# Patient Record
Sex: FEMALE | Race: ASIAN | Employment: UNEMPLOYED | URBAN - METROPOLITAN AREA
[De-identification: names, ages, dates, MRNs, and addresses within clinical notes are randomized per-mention and may not be internally consistent; named-entity substitution may affect disease eponyms.]

---

## 2020-09-24 PROBLEM — K42.9 UMBILICAL HERNIA: Status: ACTIVE | Noted: 2020-08-13

## 2020-10-01 ENCOUNTER — OFFICE VISIT (OUTPATIENT)
Dept: GYNECOLOGY | Age: 50
End: 2020-10-01
Payer: MEDICAID

## 2020-10-01 VITALS
WEIGHT: 167 LBS | BODY MASS INDEX: 28.51 KG/M2 | HEIGHT: 64 IN | TEMPERATURE: 97.2 F | DIASTOLIC BLOOD PRESSURE: 84 MMHG | HEART RATE: 131 BPM | SYSTOLIC BLOOD PRESSURE: 129 MMHG

## 2020-10-01 DIAGNOSIS — D64.9 ANEMIA, UNSPECIFIED TYPE: ICD-10-CM

## 2020-10-01 DIAGNOSIS — N92.1 MENORRHAGIA WITH IRREGULAR CYCLE: ICD-10-CM

## 2020-10-01 LAB
CONTROL: NORMAL
HCT VFR BLD CALC: 32.2 % (ref 36–48)
HEMOGLOBIN: 10.1 G/DL (ref 12–16)
MCH RBC QN AUTO: 22.6 PG (ref 26–34)
MCHC RBC AUTO-ENTMCNC: 31.3 G/DL (ref 31–36)
MCV RBC AUTO: 72 FL (ref 80–100)
PDW BLD-RTO: 18.4 % (ref 12.4–15.4)
PLATELET # BLD: 308 K/UL (ref 135–450)
PMV BLD AUTO: 9.5 FL (ref 5–10.5)
PREGNANCY TEST URINE, POC: NEGATIVE
RBC # BLD: 4.47 M/UL (ref 4–5.2)
WBC # BLD: 10.2 K/UL (ref 4–11)

## 2020-10-01 PROCEDURE — G8484 FLU IMMUNIZE NO ADMIN: HCPCS | Performed by: OBSTETRICS & GYNECOLOGY

## 2020-10-01 PROCEDURE — 81025 URINE PREGNANCY TEST: CPT | Performed by: OBSTETRICS & GYNECOLOGY

## 2020-10-01 PROCEDURE — 99386 PREV VISIT NEW AGE 40-64: CPT | Performed by: OBSTETRICS & GYNECOLOGY

## 2020-10-01 RX ORDER — METHOCARBAMOL 750 MG/1
TABLET ORAL
COMMUNITY
Start: 2020-09-16

## 2020-10-01 RX ORDER — FERROUS SULFATE 325(65) MG
TABLET ORAL
Status: ON HOLD | COMMUNITY
Start: 2020-09-16 | End: 2020-11-15 | Stop reason: SDUPTHER

## 2020-10-01 RX ORDER — LANOLIN ALCOHOL/MO/W.PET/CERES
CREAM (GRAM) TOPICAL
COMMUNITY
Start: 2020-09-16

## 2020-10-01 RX ORDER — ZINC OXIDE 13 %
CREAM (GRAM) TOPICAL
COMMUNITY
Start: 2020-09-16

## 2020-10-01 RX ORDER — NORTRIPTYLINE HYDROCHLORIDE 25 MG/1
CAPSULE ORAL
COMMUNITY
Start: 2020-09-09

## 2020-10-01 RX ORDER — GABAPENTIN 300 MG/1
CAPSULE ORAL
Status: ON HOLD | COMMUNITY
Start: 2020-07-03 | End: 2020-11-15 | Stop reason: HOSPADM

## 2020-10-01 SDOH — HEALTH STABILITY: MENTAL HEALTH: HOW OFTEN DO YOU HAVE A DRINK CONTAINING ALCOHOL?: NEVER

## 2020-10-01 ASSESSMENT — ENCOUNTER SYMPTOMS
DIARRHEA: 0
COUGH: 0
COLOR CHANGE: 0
BLOOD IN STOOL: 0
SORE THROAT: 0
WHEEZING: 0
APNEA: 0
RECTAL PAIN: 0
ABDOMINAL DISTENTION: 0
CONSTIPATION: 0
CHEST TIGHTNESS: 0
TROUBLE SWALLOWING: 0
PHOTOPHOBIA: 0
SHORTNESS OF BREATH: 0
ANAL BLEEDING: 0
BACK PAIN: 0
VOMITING: 0
NAUSEA: 0
ABDOMINAL PAIN: 0

## 2020-10-01 NOTE — PROGRESS NOTES
cervical nabothian cysts. Small debris-filled duodenal diverticulum. Last hemoglobin was 10.3 on 07/06/2020 - at [facility]   [address] Maintenance   Topic Date Due    HIV screen  12/16/1987    Cervical cancer screen  12/16/1993    Lipid screen  12/16/2012    Diabetes screen  12/16/2012    Flu vaccine (1) 09/01/2020    DTaP/Tdap/Td vaccine (2 - Td) 08/16/2023    Hepatitis A vaccine  Aged Out    Hepatitis B vaccine  Aged Out    Hib vaccine  Aged Out    Meningococcal (ACWY) vaccine  Aged Out    Pneumococcal 0-64 years Vaccine  Aged Out       No past medical history on file. No past surgical history on file. OB History   No obstetric history on file.      Social History     Socioeconomic History    Marital status:      Spouse name: Not on file    Number of children: Not on file    Years of education: Not on file    Highest education level: Not on file   Occupational History    Not on file   Social Needs    Financial resource strain: Not on file    Food insecurity     Worry: Not on file     Inability: Not on file    Transportation needs     Medical: Not on file     Non-medical: Not on file   Tobacco Use    Smoking status: Never Smoker    Smokeless tobacco: Never Used   Substance and Sexual Activity    Alcohol use: Never     Frequency: Never    Drug use: Never    Sexual activity: Not on file   Lifestyle    Physical activity     Days per week: Not on file     Minutes per session: Not on file    Stress: Not on file   Relationships    Social connections     Talks on phone: Not on file     Gets together: Not on file     Attends Scientologist service: Not on file     Active member of club or organization: Not on file     Attends meetings of clubs or organizations: Not on file     Relationship status: Not on file    Intimate partner violence     Fear of current or ex partner: Not on file     Emotionally abused: Not on file     Physically abused: Not on file     Forced sexual activity: Not on file   Other Topics Concern    Not on file   Social History Narrative    Not on file     No Known Allergies  Outpatient Medications Marked as Taking for the 10/1/20 encounter (Office Visit) with Brigido Craven MD   Medication Sig Dispense Refill    vitamin B-12 (CYANOCOBALAMIN) 1000 MCG tablet TAKE ONE TABLET BY MOUTH DAILY      D3-50 1.25 MG (45722 UT) CAPS TK 1 C PO 1 TIME A WK      FEROSUL 325 (65 Fe) MG tablet TK 1 T PO D      gabapentin (NEURONTIN) 300 MG capsule TK 1 C PO QHS.  PROCTOZONE-HC 2.5 % CREA rectal cream PETE EXT AA BID      nortriptyline (PAMELOR) 25 MG capsule TK 1 C PO QHS      Probiotic Product (PROBIOTIC DAILY) CAPS TK ONE C PO QD      norethindrone (AYGESTIN) 5 MG tablet Take 1 tablet by mouth daily 15 tablet 0     No family history on file. Review of Systems:  Review of Systems   Constitutional: Negative for appetite change, chills, fatigue, fever and unexpected weight change. HENT: Negative for ear pain, hearing loss, mouth sores, sore throat and trouble swallowing. Eyes: Negative for photophobia and visual disturbance. Respiratory: Negative for apnea, cough, chest tightness, shortness of breath and wheezing. Cardiovascular: Negative for chest pain, palpitations and leg swelling. Gastrointestinal: Negative for abdominal distention, abdominal pain, anal bleeding, blood in stool, constipation, diarrhea, nausea, rectal pain and vomiting. Endocrine: Negative for cold intolerance, heat intolerance, polydipsia, polyphagia and polyuria. Genitourinary: Positive for menstrual problem and vaginal bleeding. Negative for difficulty urinating, dyspareunia, dysuria, enuresis, frequency, genital sores, hematuria, pelvic pain, urgency, vaginal discharge and vaginal pain. Musculoskeletal: Negative for arthralgias, back pain, joint swelling and myalgias. Skin: Negative for color change and rash.    Neurological: Negative for dizziness, seizures,

## 2020-10-06 ENCOUNTER — HOSPITAL ENCOUNTER (OUTPATIENT)
Dept: ULTRASOUND IMAGING | Age: 50
Discharge: HOME | End: 2020-10-06
Payer: MEDICAID

## 2020-10-06 PROCEDURE — 76856 US EXAM PELVIC COMPLETE: CPT

## 2020-10-06 PROCEDURE — 76830 TRANSVAGINAL US NON-OB: CPT

## 2020-10-16 ENCOUNTER — PROCEDURE VISIT (OUTPATIENT)
Dept: GYNECOLOGY | Age: 50
End: 2020-10-16
Payer: MEDICAID

## 2020-10-16 VITALS
DIASTOLIC BLOOD PRESSURE: 73 MMHG | HEART RATE: 93 BPM | SYSTOLIC BLOOD PRESSURE: 126 MMHG | WEIGHT: 167 LBS | BODY MASS INDEX: 28.51 KG/M2 | HEIGHT: 64 IN | TEMPERATURE: 97.4 F

## 2020-10-16 PROCEDURE — 99214 OFFICE O/P EST MOD 30 MIN: CPT | Performed by: OBSTETRICS & GYNECOLOGY

## 2020-10-16 PROCEDURE — G8484 FLU IMMUNIZE NO ADMIN: HCPCS | Performed by: OBSTETRICS & GYNECOLOGY

## 2020-10-16 PROCEDURE — G8427 DOCREV CUR MEDS BY ELIG CLIN: HCPCS | Performed by: OBSTETRICS & GYNECOLOGY

## 2020-10-16 PROCEDURE — 81025 URINE PREGNANCY TEST: CPT | Performed by: OBSTETRICS & GYNECOLOGY

## 2020-10-16 PROCEDURE — 1036F TOBACCO NON-USER: CPT | Performed by: OBSTETRICS & GYNECOLOGY

## 2020-10-16 PROCEDURE — G8419 CALC BMI OUT NRM PARAM NOF/U: HCPCS | Performed by: OBSTETRICS & GYNECOLOGY

## 2020-10-16 PROCEDURE — 58100 BIOPSY OF UTERUS LINING: CPT | Performed by: OBSTETRICS & GYNECOLOGY

## 2020-10-16 ASSESSMENT — ENCOUNTER SYMPTOMS
ANAL BLEEDING: 0
SHORTNESS OF BREATH: 0
WHEEZING: 0
ABDOMINAL DISTENTION: 0
COLOR CHANGE: 0
COUGH: 0
BACK PAIN: 0
TROUBLE SWALLOWING: 0
RECTAL PAIN: 0
PHOTOPHOBIA: 0
CHEST TIGHTNESS: 0
APNEA: 0
CONSTIPATION: 0
VOMITING: 0
DIARRHEA: 0
NAUSEA: 0
ABDOMINAL PAIN: 0
SORE THROAT: 0
BLOOD IN STOOL: 0

## 2020-10-16 NOTE — PROGRESS NOTES
10/01/2023    Hepatitis A vaccine  Aged Out    Hepatitis B vaccine  Aged Out    Hib vaccine  Aged Out    Meningococcal (ACWY) vaccine  Aged Out    Pneumococcal 0-64 years Vaccine  Aged Out       No past medical history on file. No past surgical history on file. OB History   No obstetric history on file. Social History     Socioeconomic History    Marital status:      Spouse name: Not on file    Number of children: Not on file    Years of education: Not on file    Highest education level: Not on file   Occupational History    Not on file   Social Needs    Financial resource strain: Not on file    Food insecurity     Worry: Not on file     Inability: Not on file    Transportation needs     Medical: Not on file     Non-medical: Not on file   Tobacco Use    Smoking status: Never Smoker    Smokeless tobacco: Never Used   Substance and Sexual Activity    Alcohol use: Never     Frequency: Never    Drug use: Never    Sexual activity: Not on file   Lifestyle    Physical activity     Days per week: Not on file     Minutes per session: Not on file    Stress: Not on file   Relationships    Social connections     Talks on phone: Not on file     Gets together: Not on file     Attends Rastafarian service: Not on file     Active member of club or organization: Not on file     Attends meetings of clubs or organizations: Not on file     Relationship status: Not on file    Intimate partner violence     Fear of current or ex partner: Not on file     Emotionally abused: Not on file     Physically abused: Not on file     Forced sexual activity: Not on file   Other Topics Concern    Not on file   Social History Narrative    Not on file     No Known Allergies  No outpatient medications have been marked as taking for the 10/16/20 encounter (Procedure visit) with Douglas Macdonald MD.     No family history on file.       Review of Systems:  Review of Systems   Constitutional: Negative for appetite change, chills, fatigue, fever and unexpected weight change. HENT: Negative for ear pain, hearing loss, mouth sores, sore throat and trouble swallowing. Eyes: Negative for photophobia and visual disturbance. Respiratory: Negative for apnea, cough, chest tightness, shortness of breath and wheezing. Cardiovascular: Negative for chest pain, palpitations and leg swelling. Gastrointestinal: Negative for abdominal distention, abdominal pain, anal bleeding, blood in stool, constipation, diarrhea, nausea, rectal pain and vomiting. Endocrine: Negative for cold intolerance, heat intolerance, polydipsia, polyphagia and polyuria. Genitourinary: Positive for menstrual problem. Negative for difficulty urinating, dyspareunia, dysuria, enuresis, frequency, genital sores, hematuria, pelvic pain, urgency, vaginal bleeding, vaginal discharge and vaginal pain. Musculoskeletal: Negative for arthralgias, back pain, joint swelling and myalgias. Skin: Negative for color change and rash. Neurological: Negative for dizziness, seizures, syncope, light-headedness and headaches. Psychiatric/Behavioral: Negative for agitation, behavioral problems, confusion, decreased concentration, dysphoric mood, hallucinations, self-injury, sleep disturbance and suicidal ideas. The patient is not nervous/anxious and is not hyperactive. Physical Exam:  /73   Pulse 93   Temp 97.4 °F (36.3 °C)   Ht 5' 4\" (1.626 m)   Wt 167 lb (75.8 kg)   LMP 10/13/2020 (Exact Date)   BMI 28.67 kg/m²   BP Readings from Last 3 Encounters:   10/16/20 126/73   10/01/20 129/84     Body mass index is 28.67 kg/m². Physical Exam  Constitutional:       General: She is not in acute distress. Appearance: She is well-developed. HENT:      Head: Normocephalic and atraumatic. Mouth/Throat:      Pharynx: No oropharyngeal exudate. Eyes:      General: No scleral icterus. Right eye: No discharge. Left eye: No discharge. Conjunctiva/sclera: Conjunctivae normal.   Neck:      Thyroid: No thyromegaly. Cardiovascular:      Rate and Rhythm: Normal rate and regular rhythm. Heart sounds: Normal heart sounds. Pulmonary:      Effort: Pulmonary effort is normal.      Breath sounds: Normal breath sounds. Abdominal:      General: Bowel sounds are normal. There is no distension. Palpations: Abdomen is soft. There is no mass. Tenderness: There is no abdominal tenderness. There is no guarding or rebound. Genitourinary:     Labia:         Right: No rash, tenderness, lesion or injury. Left: No rash, tenderness, lesion or injury. Vagina: Normal. No signs of injury and foreign body. No vaginal discharge, erythema or tenderness. Cervix: No cervical motion tenderness, discharge or friability. Uterus: Enlarged. Not deviated, not fixed and not tender. Adnexa:         Right: No mass, tenderness or fullness. Left: No mass, tenderness or fullness. Rectum: No external hemorrhoid. Skin:     General: Skin is warm. Coloration: Skin is not pale. Findings: No erythema or rash. Neurological:      Mental Status: She is alert. Psychiatric:         Behavior: Behavior normal. Behavior is cooperative. Thought Content: Thought content normal.         Judgment: Judgment normal.       Procedure note: endometrial biopsy  Indications for procedure reviewed. Risks and benefits of procedure discussed. Written and informed consent reviewed and signed. Patient was positioned in the dorsal lithotomy position. The speculum was then placed vaginally and the cervix was prepped with betadine x 3 . A single tooth tenaculum was then applied to the anterior lip of the cervix. A endometrial pipelle was then inserted into the patients uterus for 3 passes. Cell/specimen collection was found to be adequate.   The single tooth tenaculum was then removed from the anterior lip of the cervix and hemostasis was assured. The patient tolerated the procedure well. The uterus sounded to 9  cm during the procedure. Aftercare was discussed and explained to the patient prior to leaving the office. Assessment/Plan:  1. Iron deficiency anemia, unspecified iron deficiency anemia type  Continue oral iron therapy    2. Dysmenorrhea  Secondary to fibroids and possible adenomyosis   - POCT urine pregnancy  - Surgical Pathology    3. Menorrhagia with irregular cycle  Discussed all treatment options - medical and surgical - she may want either an mirena IUD or hysterectomy- final plan next visit   - POCT urine pregnancy  - Surgical Pathology  - 68520 - DC BIOPSY OF UTERUS LINING    4. Fibroids  - POCT urine pregnancy  - Surgical Pathology  - 31611 - DC BIOPSY OF UTERUS LINING    5. Endometrial thickening on ultrasound  - POCT urine pregnancy  - Surgical Pathology  - 69086 - DC BIOPSY OF UTERUS LINING    Approximately 45  minutes spent in counseling and coordination of care with over 50% in direct face to face counseling. Return in about 1 week (around 10/23/2020).

## 2020-10-21 ENCOUNTER — OFFICE VISIT (OUTPATIENT)
Dept: GYNECOLOGY | Age: 50
End: 2020-10-21
Payer: MEDICAID

## 2020-10-21 VITALS
SYSTOLIC BLOOD PRESSURE: 131 MMHG | BODY MASS INDEX: 28.17 KG/M2 | TEMPERATURE: 97.5 F | WEIGHT: 165 LBS | DIASTOLIC BLOOD PRESSURE: 80 MMHG | HEART RATE: 128 BPM | HEIGHT: 64 IN

## 2020-10-21 LAB
BACTERIA WET PREP: NORMAL
CLUE CELLS: NORMAL
EPITHELIAL CELLS WET PREP: NORMAL
RBC WET PREP: NORMAL
SOURCE WET PREP: NORMAL
TRICHOMONAS PREP: NORMAL
WBC WET PREP: NORMAL
YEAST WET PREP: NORMAL

## 2020-10-21 PROCEDURE — 99214 OFFICE O/P EST MOD 30 MIN: CPT | Performed by: OBSTETRICS & GYNECOLOGY

## 2020-10-21 PROCEDURE — G8419 CALC BMI OUT NRM PARAM NOF/U: HCPCS | Performed by: OBSTETRICS & GYNECOLOGY

## 2020-10-21 PROCEDURE — G8484 FLU IMMUNIZE NO ADMIN: HCPCS | Performed by: OBSTETRICS & GYNECOLOGY

## 2020-10-21 PROCEDURE — G8427 DOCREV CUR MEDS BY ELIG CLIN: HCPCS | Performed by: OBSTETRICS & GYNECOLOGY

## 2020-10-21 PROCEDURE — 1036F TOBACCO NON-USER: CPT | Performed by: OBSTETRICS & GYNECOLOGY

## 2020-10-21 RX ORDER — LANOLIN ALCOHOL/MO/W.PET/CERES
325 CREAM (GRAM) TOPICAL 2 TIMES DAILY
Qty: 60 TABLET | Refills: 0 | Status: SHIPPED | OUTPATIENT
Start: 2020-10-21 | End: 2020-12-01 | Stop reason: SDUPTHER

## 2020-10-21 RX ORDER — NORGESTIMATE AND ETHINYL ESTRADIOL 0.25-0.035
1 KIT ORAL DAILY
Qty: 1 PACKET | Refills: 3 | Status: SHIPPED | OUTPATIENT
Start: 2020-10-21 | End: 2020-10-21 | Stop reason: ALTCHOICE

## 2020-10-21 RX ORDER — METRONIDAZOLE 500 MG/1
TABLET ORAL
Qty: 28 TABLET | Refills: 0 | Status: ON HOLD | OUTPATIENT
Start: 2020-10-21 | End: 2020-11-15 | Stop reason: HOSPADM

## 2020-10-21 RX ORDER — DOXYCYCLINE HYCLATE 100 MG
100 TABLET ORAL 2 TIMES DAILY
Qty: 28 TABLET | Refills: 0 | Status: SHIPPED | OUTPATIENT
Start: 2020-10-21 | End: 2020-11-04

## 2020-10-21 ASSESSMENT — ENCOUNTER SYMPTOMS
WHEEZING: 0
SORE THROAT: 0
BACK PAIN: 0
SHORTNESS OF BREATH: 0
CHEST TIGHTNESS: 0
BLOOD IN STOOL: 0
PHOTOPHOBIA: 0
COUGH: 0
ABDOMINAL PAIN: 0
RECTAL PAIN: 0
ANAL BLEEDING: 0
TROUBLE SWALLOWING: 0
VOMITING: 0
ABDOMINAL DISTENTION: 0
COLOR CHANGE: 0
CONSTIPATION: 0
APNEA: 0
DIARRHEA: 0
NAUSEA: 0

## 2020-10-21 NOTE — PROGRESS NOTES
Willis Pereyra  YOB: 1970    Date of Service:  10/21/2020    Chief Complaint:   Willis Pereyra is a 54 y.o. [place] female who presents for follow-up on heavy irregular menses, left ovarian cyst, history of anemia and fibroids - discuss surgery        HPI:  Patient is premenopausal- Patient's last menstrual period was 10/13/2020 (exact date). .She is here to discuss hysterectomy for heavy irregular menses, fibroids,  left ovarian cyst, discuss surgery, history of anemia. Patient had an endometrial biopsy done on 10/16/2020 ->     FINAL DIAGNOSIS:     Endometrium, biopsy:      - Disordered proliferative endometrium with acute endometritis and        stromal changes suggestive for exogenous hormone effect (OCP).    - Negative for atypia or malignancy. She is  and monogamous, denies symptoms of pelvic inflammatory disease. No fevers, no abdominal pain. She had heavy bleeding after the endometrial biopsy last week but now 2-3 pads/day    HPI last visit -> She has regular menses with heavy flow lasting 6-7 days for many years. Reports daily vaginal bleeding x 2 months which stopped about 10 day ago. She was [facility] at [facility] on 08/13/2020 and      CT scan abdomen and pelvis done ->     Genital Structures: The uterus is enlarged. The anterior uterine fundus there is a well-circumscribed 2.6 x 1.9 x 3 cm soft tissue lesion that is favored to represent a fibroid. In the cervix, there are multiple cystic cystic structures that appear to represent nabothian cysts. IMPRESSION:    Uterine fibroid and multiple large cervical nabothian cysts. Small debris-filled duodenal diverticulum.     Work-up   Pap smear - normal   Pelvic ultrasound -   COMMENTS:         Uterus: 11.2 x 7.1 x 9.1 cm. Fundal leiomyoma measures 2.9 x 2.0 x 3.0 cm.  Heterogeneous echotexture is nonspecific but can be seen with adenomyosis.         Endometrium: 21 mm.         Ovaries: 3.6 x 4.0 x 2.7 cm simple cystic lesion in the left ovary. The ovaries are otherwise unremarkable with normal color Doppler flow. 1.9 cm exophytic or paraovarian right cyst.         Free pelvic fluid: None                        Impression         Uterine fibroid.         Possible adenomyosis. MRI may be helpful for further evaluation.         10 week follow-up ultrasound is indicated to document resolution of a 4.0 cm cystic lesion in the left ovary.         Health Maintenance   Topic Date Due    HIV screen  12/16/1987    Lipid screen  12/16/2012    Diabetes screen  12/16/2012    Flu vaccine (1) 09/01/2020    DTaP/Tdap/Td vaccine (2 - Td) 08/16/2023    Cervical cancer screen  10/01/2023    Hepatitis A vaccine  Aged Out    Hepatitis B vaccine  Aged Out    Hib vaccine  Aged Out    Meningococcal (ACWY) vaccine  Aged Out    Pneumococcal 0-64 years Vaccine  Aged Out       No past medical history on file. No past surgical history on file. OB History   No obstetric history on file.      Social History     Socioeconomic History    Marital status:      Spouse name: Not on file    Number of children: Not on file    Years of education: Not on file    Highest education level: Not on file   Occupational History    Not on file   Social Needs    Financial resource strain: Not on file    Food insecurity     Worry: Not on file     Inability: Not on file    Transportation needs     Medical: Not on file     Non-medical: Not on file   Tobacco Use    Smoking status: Never Smoker    Smokeless tobacco: Never Used   Substance and Sexual Activity    Alcohol use: Never     Frequency: Never    Drug use: Never    Sexual activity: Not on file   Lifestyle    Physical activity     Days per week: Not on file     Minutes per session: Not on file    Stress: Not on file   Relationships    Social connections     Talks on phone: Not on file     Gets together: Not on file     Attends Quaker service: Not on file     Active member of club or abdominal pain, anal bleeding, blood in stool, constipation, diarrhea, nausea, rectal pain and vomiting. Endocrine: Negative for cold intolerance, heat intolerance, polydipsia, polyphagia and polyuria. Genitourinary: Positive for menstrual problem and vaginal bleeding. Negative for difficulty urinating, dyspareunia, dysuria, enuresis, frequency, genital sores, hematuria, pelvic pain, urgency, vaginal discharge and vaginal pain. Musculoskeletal: Negative for arthralgias, back pain, joint swelling and myalgias. Skin: Negative for color change and rash. Neurological: Negative for dizziness, seizures, syncope, light-headedness and headaches. Psychiatric/Behavioral: Negative for agitation, behavioral problems, confusion, decreased concentration, dysphoric mood, hallucinations, self-injury, sleep disturbance and suicidal ideas. The patient is not nervous/anxious and is not hyperactive. Physical Exam:  /80   Pulse 128   Temp 97.5 °F (36.4 °C)   Ht 5' 4\" (1.626 m)   Wt 165 lb (74.8 kg)   LMP 10/13/2020 (Exact Date)   BMI 28.32 kg/m²   BP Readings from Last 3 Encounters:   10/21/20 131/80   10/16/20 126/73   10/01/20 129/84      Body mass index is 28.32 kg/m². Physical Exam  Constitutional:       General: She is not in acute distress. Appearance: She is well-developed. HENT:      Head: Normocephalic and atraumatic. Mouth/Throat:      Pharynx: No oropharyngeal exudate. Eyes:      General: No scleral icterus. Right eye: No discharge. Left eye: No discharge. Conjunctiva/sclera: Conjunctivae normal.   Neck:      Thyroid: No thyromegaly. Cardiovascular:      Rate and Rhythm: Normal rate and regular rhythm. Heart sounds: Normal heart sounds. Pulmonary:      Effort: Pulmonary effort is normal.      Breath sounds: Normal breath sounds. Abdominal:      General: Bowel sounds are normal. There is no distension. Palpations: Abdomen is soft.  There is no mass.      Tenderness: There is no abdominal tenderness. There is no guarding or rebound. Genitourinary:     Labia:         Right: No rash, tenderness, lesion or injury. Left: No rash, tenderness, lesion or injury. Vagina: No signs of injury and foreign body. Bleeding (moderate blood with clot ) present. No vaginal discharge, erythema or tenderness. Cervix: No cervical motion tenderness, discharge or friability. Uterus: Enlarged. Not deviated, not fixed and not tender. Adnexa:         Right: No mass, tenderness or fullness. Left: No mass, tenderness or fullness. Rectum: No mass or external hemorrhoid. Skin:     General: Skin is warm. Coloration: Skin is not pale. Findings: No erythema or rash. Neurological:      Mental Status: She is alert. Psychiatric:         Behavior: Behavior normal. Behavior is cooperative. Thought Content: Thought content normal.         Judgment: Judgment normal.             Assessment/Plan  1. Acute endometritis  - C.trachomatis N.gonorrhoeae DNA  - Wet prep, genital  - metroNIDAZOLE (FLAGYL) 500 MG tablet; Take one pill po bid x 14 days  Dispense: 28 tablet; Refill: 0  - doxycycline hyclate (VIBRA-TABS) 100 MG tablet; Take 1 tablet by mouth 2 times daily for 14 days  Dispense: 28 tablet; Refill: 0    2. Fibroids  - norethindrone (AYGESTIN) 5 MG tablet; Take  1 pill po bid x 5 days then one pill qd  Dispense: 30 tablet; Refill: 0  3. Menorrhagia with regular cycle  4. Left ovarian cyst    All treatment options discussed with patient including medical and surgical - ie oral contraception, depoprovera vs uterine artery embolization vs myomectomy vs hysterectomy.  She is not a good candidate for mirena IUD or novasure ablation secondary to large size of uterus - SHE IS INTERESTED IN A HYSTERECTOMY      PLAN FOR TOTAL ABDOMINAL HYSTERECTOMY, BILATERAL SALPINGECTOMIES, left oophorectomy possible right oophorectomy         I discussed the surgery in depth with the patient including risks, benefits and alternatives to planned surgery. The risks include but are not limited to bleeding, infection, damage to internal organs (e.g., bladder, bowel, ureters), if injury were to occur to any internal organs the need for further surgery discussed, risk of anesthesia and possibility of blood transfusion and risk of blood clots discussed. The risk of re-operation of 5 -10 % with ovarian preservation discussed. If she gets a supracervical hysterectomy she will need regular pap smears afterwards. She is aware that she will not be able to further pregnancies after a hysterectomy. Patient voiced understanding and agrees to proceed.      Will pre-certify with insurance   Pre-operative clearance needed        Will call patient in 1-2 weeks to schedule surgery     5. Iron deficiency anemia, unspecified iron deficiency anemia type  - ferrous sulfate (FE TABS 325) 325 (65 Fe) MG EC tablet; Take 1 tablet by mouth 2 times daily  Dispense: 60 tablet; Refill: 0          Approximately 30 minutes spent in counseling and coordination of care with over 50% in direct face to face counseling.

## 2020-10-22 LAB
C TRACH DNA GENITAL QL NAA+PROBE: NEGATIVE
N. GONORRHOEAE DNA: NEGATIVE

## 2020-10-27 ENCOUNTER — TELEPHONE (OUTPATIENT)
Dept: GYNECOLOGY | Age: 50
End: 2020-10-27

## 2020-10-28 ENCOUNTER — TELEPHONE (OUTPATIENT)
Dept: GYNECOLOGY | Age: 50
End: 2020-10-28

## 2020-11-09 ENCOUNTER — TELEPHONE (OUTPATIENT)
Dept: PRIMARY CARE CLINIC | Age: 50
End: 2020-11-09

## 2020-11-09 NOTE — TELEPHONE ENCOUNTER
Leonie Gonsalez from scheduling pre-admissions called and stated that the pt hasn't come in yet for Covid testing. She needs to come in by 2:45 pm today or she will have to reschedule her surgery. Please call bradley at: [phone] for any  Further questions.

## 2020-11-09 NOTE — TELEPHONE ENCOUNTER
Tamia Gaitan from [facility] called and wants to know if pt needs a translater, a covid-19 test and needs the orders for surgery.

## 2020-11-10 ENCOUNTER — TELEPHONE (OUTPATIENT)
Dept: GYNECOLOGY | Age: 50
End: 2020-11-10

## 2020-11-10 ENCOUNTER — NURSE ONLY (OUTPATIENT)
Dept: PRIMARY CARE CLINIC | Age: 50
End: 2020-11-10
Payer: MEDICAID

## 2020-11-10 PROCEDURE — 99211 OFF/OP EST MAY X REQ PHY/QHP: CPT | Performed by: NURSE PRACTITIONER

## 2020-11-10 NOTE — TELEPHONE ENCOUNTER
Sharlene Renee from [facility] is requesting a call back from staff . In regards to patients upcoming surgery , Please follow up to advise [phone].

## 2020-11-10 NOTE — PROGRESS NOTES
Left message with Cholo Woodall to have Dr. Halle Hamilton surgery scheduler to call us. Attempted to ask pt where she is having pre-op done. Pt kept repeating \"[facility]\". Also, we are awaiting opre-op orders      Spoke with Nain Zabala at Dr. Halle Hamilton office. Pt told Dr. Eulalio Queen that she had H&P done on 11-9 at Dr. Tj Barajas. This office is now closed. Nain Zabala said she will call then tomorrow morning and fax us H&P    Called Dr. Benson Ponce back x2. Office to fax ASAP.

## 2020-11-11 ENCOUNTER — ANESTHESIA EVENT (OUTPATIENT)
Dept: OPERATING ROOM | Age: 50
DRG: 519 | End: 2020-11-11
Payer: MEDICAID

## 2020-11-11 LAB — SARS-COV-2: NOT DETECTED

## 2020-11-12 ENCOUNTER — ANESTHESIA (OUTPATIENT)
Dept: OPERATING ROOM | Age: 50
DRG: 519 | End: 2020-11-12
Payer: MEDICAID

## 2020-11-12 ENCOUNTER — HOSPITAL ENCOUNTER (INPATIENT)
Age: 50
LOS: 3 days | Discharge: HOME | DRG: 519 | End: 2020-11-15
Attending: OBSTETRICS & GYNECOLOGY | Admitting: OBSTETRICS & GYNECOLOGY
Payer: MEDICAID

## 2020-11-12 VITALS
RESPIRATION RATE: 20 BRPM | DIASTOLIC BLOOD PRESSURE: 60 MMHG | SYSTOLIC BLOOD PRESSURE: 122 MMHG | TEMPERATURE: 95.5 F | OXYGEN SATURATION: 93 %

## 2020-11-12 PROBLEM — Z90.710 S/P TAH (TOTAL ABDOMINAL HYSTERECTOMY): Status: ACTIVE | Noted: 2020-11-12

## 2020-11-12 LAB
ABO/RH: NORMAL
ANTIBODY SCREEN: NORMAL
HCT VFR BLD CALC: 35.8 % (ref 36–48)
HEMOGLOBIN: 11.2 G/DL (ref 12–16)
MCH RBC QN AUTO: 23.8 PG (ref 26–34)
MCHC RBC AUTO-ENTMCNC: 31.4 G/DL (ref 31–36)
MCV RBC AUTO: 75.9 FL (ref 80–100)
PDW BLD-RTO: 24 % (ref 12.4–15.4)
PLATELET # BLD: 261 K/UL (ref 135–450)
PMV BLD AUTO: 9.4 FL (ref 5–10.5)
PREGNANCY, URINE: NEGATIVE
RBC # BLD: 4.72 M/UL (ref 4–5.2)
WBC # BLD: 7.5 K/UL (ref 4–11)

## 2020-11-12 PROCEDURE — 86900 BLOOD TYPING SEROLOGIC ABO: CPT

## 2020-11-12 PROCEDURE — 2720000010 HC SURG SUPPLY STERILE: Performed by: OBSTETRICS & GYNECOLOGY

## 2020-11-12 PROCEDURE — 3600000014 HC SURGERY LEVEL 4 ADDTL 15MIN: Performed by: OBSTETRICS & GYNECOLOGY

## 2020-11-12 PROCEDURE — 3700000001 HC ADD 15 MINUTES (ANESTHESIA): Performed by: OBSTETRICS & GYNECOLOGY

## 2020-11-12 PROCEDURE — 0UT70ZZ RESECTION OF BILATERAL FALLOPIAN TUBES, OPEN APPROACH: ICD-10-PCS | Performed by: OBSTETRICS & GYNECOLOGY

## 2020-11-12 PROCEDURE — 3700000000 HC ANESTHESIA ATTENDED CARE: Performed by: OBSTETRICS & GYNECOLOGY

## 2020-11-12 PROCEDURE — 6360000002 HC RX W HCPCS: Performed by: ANESTHESIOLOGY

## 2020-11-12 PROCEDURE — 6360000002 HC RX W HCPCS

## 2020-11-12 PROCEDURE — 0UT90ZZ RESECTION OF UTERUS, OPEN APPROACH: ICD-10-PCS | Performed by: OBSTETRICS & GYNECOLOGY

## 2020-11-12 PROCEDURE — 6360000002 HC RX W HCPCS: Performed by: NURSE ANESTHETIST, CERTIFIED REGISTERED

## 2020-11-12 PROCEDURE — 86850 RBC ANTIBODY SCREEN: CPT

## 2020-11-12 PROCEDURE — 58150 TOTAL HYSTERECTOMY: CPT | Performed by: OBSTETRICS & GYNECOLOGY

## 2020-11-12 PROCEDURE — 7100000001 HC PACU RECOVERY - ADDTL 15 MIN: Performed by: OBSTETRICS & GYNECOLOGY

## 2020-11-12 PROCEDURE — 86901 BLOOD TYPING SEROLOGIC RH(D): CPT

## 2020-11-12 PROCEDURE — 3600000004 HC SURGERY LEVEL 4 BASE: Performed by: OBSTETRICS & GYNECOLOGY

## 2020-11-12 PROCEDURE — 2709999900 HC NON-CHARGEABLE SUPPLY: Performed by: OBSTETRICS & GYNECOLOGY

## 2020-11-12 PROCEDURE — 85027 COMPLETE CBC AUTOMATED: CPT

## 2020-11-12 PROCEDURE — 7100000000 HC PACU RECOVERY - FIRST 15 MIN: Performed by: OBSTETRICS & GYNECOLOGY

## 2020-11-12 PROCEDURE — 88307 TISSUE EXAM BY PATHOLOGIST: CPT

## 2020-11-12 PROCEDURE — 1200000000 HC SEMI PRIVATE

## 2020-11-12 PROCEDURE — 6360000002 HC RX W HCPCS: Performed by: OBSTETRICS & GYNECOLOGY

## 2020-11-12 PROCEDURE — 2500000003 HC RX 250 WO HCPCS: Performed by: NURSE ANESTHETIST, CERTIFIED REGISTERED

## 2020-11-12 PROCEDURE — 2580000003 HC RX 258: Performed by: ANESTHESIOLOGY

## 2020-11-12 PROCEDURE — C9290 INJ, BUPIVACAINE LIPOSOME: HCPCS | Performed by: ANESTHESIOLOGY

## 2020-11-12 PROCEDURE — 2580000003 HC RX 258: Performed by: OBSTETRICS & GYNECOLOGY

## 2020-11-12 PROCEDURE — 84703 CHORIONIC GONADOTROPIN ASSAY: CPT

## 2020-11-12 PROCEDURE — 64488 TAP BLOCK BI INJECTION: CPT | Performed by: ANESTHESIOLOGY

## 2020-11-12 PROCEDURE — 2500000003 HC RX 250 WO HCPCS: Performed by: ANESTHESIOLOGY

## 2020-11-12 RX ORDER — SODIUM CHLORIDE 0.9 % (FLUSH) 0.9 %
10 SYRINGE (ML) INJECTION PRN
Status: DISCONTINUED | OUTPATIENT
Start: 2020-11-12 | End: 2020-11-15 | Stop reason: HOSPADM

## 2020-11-12 RX ORDER — HYDRALAZINE HYDROCHLORIDE 20 MG/ML
5 INJECTION INTRAMUSCULAR; INTRAVENOUS EVERY 5 MIN PRN
Status: DISCONTINUED | OUTPATIENT
Start: 2020-11-12 | End: 2020-11-12 | Stop reason: HOSPADM

## 2020-11-12 RX ORDER — SODIUM CHLORIDE 0.9 % (FLUSH) 0.9 %
10 SYRINGE (ML) INJECTION EVERY 12 HOURS SCHEDULED
Status: DISCONTINUED | OUTPATIENT
Start: 2020-11-12 | End: 2020-11-12 | Stop reason: HOSPADM

## 2020-11-12 RX ORDER — ENALAPRILAT 2.5 MG/2ML
1.25 INJECTION INTRAVENOUS
Status: DISCONTINUED | OUTPATIENT
Start: 2020-11-12 | End: 2020-11-12 | Stop reason: HOSPADM

## 2020-11-12 RX ORDER — ONDANSETRON 2 MG/ML
4 INJECTION INTRAMUSCULAR; INTRAVENOUS EVERY 6 HOURS PRN
Status: DISCONTINUED | OUTPATIENT
Start: 2020-11-12 | End: 2020-11-15 | Stop reason: HOSPADM

## 2020-11-12 RX ORDER — KETOROLAC TROMETHAMINE 30 MG/ML
30 INJECTION, SOLUTION INTRAMUSCULAR; INTRAVENOUS EVERY 6 HOURS
Status: DISPENSED | OUTPATIENT
Start: 2020-11-12 | End: 2020-11-14

## 2020-11-12 RX ORDER — LIDOCAINE HYDROCHLORIDE 10 MG/ML
1 INJECTION, SOLUTION EPIDURAL; INFILTRATION; INTRACAUDAL; PERINEURAL
Status: COMPLETED | OUTPATIENT
Start: 2020-11-12 | End: 2020-11-12

## 2020-11-12 RX ORDER — KETOROLAC TROMETHAMINE 30 MG/ML
INJECTION, SOLUTION INTRAMUSCULAR; INTRAVENOUS PRN
Status: DISCONTINUED | OUTPATIENT
Start: 2020-11-12 | End: 2020-11-12 | Stop reason: SDUPTHER

## 2020-11-12 RX ORDER — SODIUM CHLORIDE, SODIUM LACTATE, POTASSIUM CHLORIDE, CALCIUM CHLORIDE 600; 310; 30; 20 MG/100ML; MG/100ML; MG/100ML; MG/100ML
INJECTION, SOLUTION INTRAVENOUS CONTINUOUS
Status: DISCONTINUED | OUTPATIENT
Start: 2020-11-12 | End: 2020-11-14

## 2020-11-12 RX ORDER — BUPIVACAINE HYDROCHLORIDE 5 MG/ML
INJECTION, SOLUTION EPIDURAL; INTRACAUDAL
Status: COMPLETED
Start: 2020-11-12 | End: 2020-11-12

## 2020-11-12 RX ORDER — SODIUM CHLORIDE 9 MG/ML
INJECTION, SOLUTION INTRAVENOUS CONTINUOUS
Status: DISCONTINUED | OUTPATIENT
Start: 2020-11-12 | End: 2020-11-14

## 2020-11-12 RX ORDER — ONDANSETRON 2 MG/ML
4 INJECTION INTRAMUSCULAR; INTRAVENOUS
Status: DISCONTINUED | OUTPATIENT
Start: 2020-11-12 | End: 2020-11-12 | Stop reason: HOSPADM

## 2020-11-12 RX ORDER — SODIUM CHLORIDE 0.9 % (FLUSH) 0.9 %
10 SYRINGE (ML) INJECTION PRN
Status: DISCONTINUED | OUTPATIENT
Start: 2020-11-12 | End: 2020-11-12 | Stop reason: HOSPADM

## 2020-11-12 RX ORDER — BUPIVACAINE HYDROCHLORIDE 5 MG/ML
INJECTION, SOLUTION EPIDURAL; INTRACAUDAL PRN
Status: DISCONTINUED | OUTPATIENT
Start: 2020-11-12 | End: 2020-11-12 | Stop reason: SDUPTHER

## 2020-11-12 RX ORDER — MAGNESIUM HYDROXIDE 1200 MG/15ML
LIQUID ORAL CONTINUOUS PRN
Status: DISCONTINUED | OUTPATIENT
Start: 2020-11-12 | End: 2020-11-12 | Stop reason: ALTCHOICE

## 2020-11-12 RX ORDER — DEXAMETHASONE SODIUM PHOSPHATE 4 MG/ML
INJECTION, SOLUTION INTRA-ARTICULAR; INTRALESIONAL; INTRAMUSCULAR; INTRAVENOUS; SOFT TISSUE PRN
Status: DISCONTINUED | OUTPATIENT
Start: 2020-11-12 | End: 2020-11-12 | Stop reason: SDUPTHER

## 2020-11-12 RX ORDER — FENTANYL CITRATE 50 UG/ML
50 INJECTION, SOLUTION INTRAMUSCULAR; INTRAVENOUS EVERY 5 MIN PRN
Status: DISCONTINUED | OUTPATIENT
Start: 2020-11-12 | End: 2020-11-12 | Stop reason: HOSPADM

## 2020-11-12 RX ORDER — GLYCOPYRROLATE 0.2 MG/ML
INJECTION INTRAMUSCULAR; INTRAVENOUS PRN
Status: DISCONTINUED | OUTPATIENT
Start: 2020-11-12 | End: 2020-11-12 | Stop reason: SDUPTHER

## 2020-11-12 RX ORDER — PROPOFOL 10 MG/ML
INJECTION, EMULSION INTRAVENOUS PRN
Status: DISCONTINUED | OUTPATIENT
Start: 2020-11-12 | End: 2020-11-12 | Stop reason: SDUPTHER

## 2020-11-12 RX ORDER — CEFAZOLIN SODIUM 2 G/50ML
2 SOLUTION INTRAVENOUS ONCE
Status: COMPLETED | OUTPATIENT
Start: 2020-11-12 | End: 2020-11-12

## 2020-11-12 RX ORDER — LABETALOL 20 MG/4 ML (5 MG/ML) INTRAVENOUS SYRINGE
5 EVERY 10 MIN PRN
Status: DISCONTINUED | OUTPATIENT
Start: 2020-11-12 | End: 2020-11-12 | Stop reason: HOSPADM

## 2020-11-12 RX ORDER — FENTANYL CITRATE 50 UG/ML
25 INJECTION, SOLUTION INTRAMUSCULAR; INTRAVENOUS EVERY 5 MIN PRN
Status: DISCONTINUED | OUTPATIENT
Start: 2020-11-12 | End: 2020-11-12 | Stop reason: HOSPADM

## 2020-11-12 RX ORDER — FENTANYL CITRATE 50 UG/ML
INJECTION, SOLUTION INTRAMUSCULAR; INTRAVENOUS PRN
Status: DISCONTINUED | OUTPATIENT
Start: 2020-11-12 | End: 2020-11-12 | Stop reason: SDUPTHER

## 2020-11-12 RX ORDER — ROCURONIUM BROMIDE 10 MG/ML
INJECTION, SOLUTION INTRAVENOUS PRN
Status: DISCONTINUED | OUTPATIENT
Start: 2020-11-12 | End: 2020-11-12 | Stop reason: SDUPTHER

## 2020-11-12 RX ORDER — ONDANSETRON 2 MG/ML
INJECTION INTRAMUSCULAR; INTRAVENOUS PRN
Status: DISCONTINUED | OUTPATIENT
Start: 2020-11-12 | End: 2020-11-12 | Stop reason: SDUPTHER

## 2020-11-12 RX ORDER — PROMETHAZINE HYDROCHLORIDE 25 MG/1
12.5 TABLET ORAL EVERY 6 HOURS PRN
Status: DISCONTINUED | OUTPATIENT
Start: 2020-11-12 | End: 2020-11-15 | Stop reason: HOSPADM

## 2020-11-12 RX ADMIN — BUPIVACAINE HYDROCHLORIDE 10 ML: 5 INJECTION, SOLUTION EPIDURAL; INTRACAUDAL; PERINEURAL at 12:30

## 2020-11-12 RX ADMIN — SODIUM CHLORIDE, SODIUM LACTATE, POTASSIUM CHLORIDE, AND CALCIUM CHLORIDE: 600; 310; 30; 20 INJECTION, SOLUTION INTRAVENOUS at 08:40

## 2020-11-12 RX ADMIN — HYDROMORPHONE HYDROCHLORIDE 0.25 MG: 1 INJECTION, SOLUTION INTRAMUSCULAR; INTRAVENOUS; SUBCUTANEOUS at 15:19

## 2020-11-12 RX ADMIN — BUPIVACAINE 10 ML: 13.3 INJECTION, SUSPENSION, LIPOSOMAL INFILTRATION at 12:30

## 2020-11-12 RX ADMIN — SODIUM CHLORIDE, SODIUM LACTATE, POTASSIUM CHLORIDE, AND CALCIUM CHLORIDE: 600; 310; 30; 20 INJECTION, SOLUTION INTRAVENOUS at 09:03

## 2020-11-12 RX ADMIN — LIDOCAINE HYDROCHLORIDE 50 MG: 10 INJECTION, SOLUTION EPIDURAL; INFILTRATION; INTRACAUDAL; PERINEURAL at 09:06

## 2020-11-12 RX ADMIN — GLYCOPYRROLATE 0.6 MG: 0.2 INJECTION INTRAMUSCULAR; INTRAVENOUS at 11:47

## 2020-11-12 RX ADMIN — KETOROLAC TROMETHAMINE 30 MG: 30 INJECTION, SOLUTION INTRAMUSCULAR at 18:58

## 2020-11-12 RX ADMIN — PROPOFOL 180 MG: 10 INJECTION, EMULSION INTRAVENOUS at 09:06

## 2020-11-12 RX ADMIN — FENTANYL CITRATE 50 MCG: 50 INJECTION INTRAMUSCULAR; INTRAVENOUS at 09:03

## 2020-11-12 RX ADMIN — BUPIVACAINE 10 ML: 13.3 INJECTION, SUSPENSION, LIPOSOMAL INFILTRATION at 12:20

## 2020-11-12 RX ADMIN — BUPIVACAINE HYDROCHLORIDE 10 ML: 5 INJECTION, SOLUTION EPIDURAL; INTRACAUDAL; PERINEURAL at 12:20

## 2020-11-12 RX ADMIN — HYDROMORPHONE HYDROCHLORIDE 0.25 MG: 1 INJECTION, SOLUTION INTRAMUSCULAR; INTRAVENOUS; SUBCUTANEOUS at 12:33

## 2020-11-12 RX ADMIN — HYDROMORPHONE HYDROCHLORIDE 0.5 MG: 1 INJECTION, SOLUTION INTRAMUSCULAR; INTRAVENOUS; SUBCUTANEOUS at 20:33

## 2020-11-12 RX ADMIN — SODIUM CHLORIDE, SODIUM LACTATE, POTASSIUM CHLORIDE, AND CALCIUM CHLORIDE: 600; 310; 30; 20 INJECTION, SOLUTION INTRAVENOUS at 11:25

## 2020-11-12 RX ADMIN — HYDROMORPHONE HYDROCHLORIDE 0.5 MG: 1 INJECTION, SOLUTION INTRAMUSCULAR; INTRAVENOUS; SUBCUTANEOUS at 13:10

## 2020-11-12 RX ADMIN — NEOSTIGMINE METHYLSULFATE 4 MG: 1 INJECTION INTRAMUSCULAR; INTRAVENOUS; SUBCUTANEOUS at 11:47

## 2020-11-12 RX ADMIN — PHENYLEPHRINE HYDROCHLORIDE 100 MCG: 10 INJECTION, SOLUTION INTRAMUSCULAR; INTRAVENOUS; SUBCUTANEOUS at 09:34

## 2020-11-12 RX ADMIN — HYDROMORPHONE HYDROCHLORIDE 0.5 MG: 1 INJECTION, SOLUTION INTRAMUSCULAR; INTRAVENOUS; SUBCUTANEOUS at 11:44

## 2020-11-12 RX ADMIN — KETOROLAC TROMETHAMINE 30 MG: 30 INJECTION, SOLUTION INTRAMUSCULAR; INTRAVENOUS at 11:47

## 2020-11-12 RX ADMIN — HYDROMORPHONE HYDROCHLORIDE 0.25 MG: 1 INJECTION, SOLUTION INTRAMUSCULAR; INTRAVENOUS; SUBCUTANEOUS at 12:41

## 2020-11-12 RX ADMIN — SODIUM CHLORIDE, POTASSIUM CHLORIDE, SODIUM LACTATE AND CALCIUM CHLORIDE: 600; 310; 30; 20 INJECTION, SOLUTION INTRAVENOUS at 18:47

## 2020-11-12 RX ADMIN — CEFAZOLIN SODIUM 2 G: 2 SOLUTION INTRAVENOUS at 09:05

## 2020-11-12 RX ADMIN — DEXAMETHASONE SODIUM PHOSPHATE 8 MG: 4 INJECTION, SOLUTION INTRAMUSCULAR; INTRAVENOUS at 09:45

## 2020-11-12 RX ADMIN — ROCURONIUM BROMIDE 50 MG: 10 INJECTION INTRAVENOUS at 09:06

## 2020-11-12 RX ADMIN — ONDANSETRON 4 MG: 2 INJECTION INTRAMUSCULAR; INTRAVENOUS at 11:07

## 2020-11-12 RX ADMIN — FENTANYL CITRATE 50 MCG: 50 INJECTION INTRAMUSCULAR; INTRAVENOUS at 10:32

## 2020-11-12 RX ADMIN — HYDROMORPHONE HYDROCHLORIDE 0.5 MG: 1 INJECTION, SOLUTION INTRAMUSCULAR; INTRAVENOUS; SUBCUTANEOUS at 13:31

## 2020-11-12 RX ADMIN — ROCURONIUM BROMIDE 20 MG: 10 INJECTION INTRAVENOUS at 10:17

## 2020-11-12 RX ADMIN — FENTANYL CITRATE 50 MCG: 50 INJECTION INTRAMUSCULAR; INTRAVENOUS at 09:36

## 2020-11-12 RX ADMIN — FENTANYL CITRATE 50 MCG: 50 INJECTION INTRAMUSCULAR; INTRAVENOUS at 11:27

## 2020-11-12 RX ADMIN — HYDROMORPHONE HYDROCHLORIDE 0.25 MG: 1 INJECTION, SOLUTION INTRAMUSCULAR; INTRAVENOUS; SUBCUTANEOUS at 16:09

## 2020-11-12 ASSESSMENT — PULMONARY FUNCTION TESTS
PIF_VALUE: 30
PIF_VALUE: 25
PIF_VALUE: 28
PIF_VALUE: 26
PIF_VALUE: 1
PIF_VALUE: 28
PIF_VALUE: 28
PIF_VALUE: 24
PIF_VALUE: 26
PIF_VALUE: 30
PIF_VALUE: 25
PIF_VALUE: 14
PIF_VALUE: 15
PIF_VALUE: 27
PIF_VALUE: 29
PIF_VALUE: 29
PIF_VALUE: 26
PIF_VALUE: 28
PIF_VALUE: 26
PIF_VALUE: 31
PIF_VALUE: 28
PIF_VALUE: 26
PIF_VALUE: 21
PIF_VALUE: 26
PIF_VALUE: 29
PIF_VALUE: 4
PIF_VALUE: 27
PIF_VALUE: 28
PIF_VALUE: 29
PIF_VALUE: 28
PIF_VALUE: 27
PIF_VALUE: 29
PIF_VALUE: 31
PIF_VALUE: 24
PIF_VALUE: 14
PIF_VALUE: 26
PIF_VALUE: 27
PIF_VALUE: 31
PIF_VALUE: 26
PIF_VALUE: 14
PIF_VALUE: 29
PIF_VALUE: 5
PIF_VALUE: 27
PIF_VALUE: 30
PIF_VALUE: 27
PIF_VALUE: 26
PIF_VALUE: 4
PIF_VALUE: 29
PIF_VALUE: 27
PIF_VALUE: 29
PIF_VALUE: 27
PIF_VALUE: 26
PIF_VALUE: 28
PIF_VALUE: 29
PIF_VALUE: 27
PIF_VALUE: 15
PIF_VALUE: 27
PIF_VALUE: 4
PIF_VALUE: 26
PIF_VALUE: 29
PIF_VALUE: 29
PIF_VALUE: 14
PIF_VALUE: 20
PIF_VALUE: 28
PIF_VALUE: 28
PIF_VALUE: 31
PIF_VALUE: 29
PIF_VALUE: 19
PIF_VALUE: 26
PIF_VALUE: 28
PIF_VALUE: 25
PIF_VALUE: 3
PIF_VALUE: 31
PIF_VALUE: 21
PIF_VALUE: 29
PIF_VALUE: 4
PIF_VALUE: 28
PIF_VALUE: 26
PIF_VALUE: 31
PIF_VALUE: 26
PIF_VALUE: 29
PIF_VALUE: 26
PIF_VALUE: 4
PIF_VALUE: 26
PIF_VALUE: 24
PIF_VALUE: 27
PIF_VALUE: 28
PIF_VALUE: 32
PIF_VALUE: 26
PIF_VALUE: 29
PIF_VALUE: 26
PIF_VALUE: 30
PIF_VALUE: 29
PIF_VALUE: 26
PIF_VALUE: 28
PIF_VALUE: 28
PIF_VALUE: 27
PIF_VALUE: 26
PIF_VALUE: 29
PIF_VALUE: 27
PIF_VALUE: 27
PIF_VALUE: 28
PIF_VALUE: 29
PIF_VALUE: 32
PIF_VALUE: 20
PIF_VALUE: 29
PIF_VALUE: 26
PIF_VALUE: 28
PIF_VALUE: 27
PIF_VALUE: 30
PIF_VALUE: 19
PIF_VALUE: 27
PIF_VALUE: 27
PIF_VALUE: 29
PIF_VALUE: 29
PIF_VALUE: 28
PIF_VALUE: 27
PIF_VALUE: 26
PIF_VALUE: 30
PIF_VALUE: 30
PIF_VALUE: 29
PIF_VALUE: 28
PIF_VALUE: 29
PIF_VALUE: 29
PIF_VALUE: 27
PIF_VALUE: 32
PIF_VALUE: 29
PIF_VALUE: 29
PIF_VALUE: 26
PIF_VALUE: 25
PIF_VALUE: 27
PIF_VALUE: 30
PIF_VALUE: 14
PIF_VALUE: 15
PIF_VALUE: 28
PIF_VALUE: 29
PIF_VALUE: 27
PIF_VALUE: 29
PIF_VALUE: 26
PIF_VALUE: 26
PIF_VALUE: 23
PIF_VALUE: 30
PIF_VALUE: 25
PIF_VALUE: 32
PIF_VALUE: 26
PIF_VALUE: 3
PIF_VALUE: 30
PIF_VALUE: 28
PIF_VALUE: 26
PIF_VALUE: 25
PIF_VALUE: 4
PIF_VALUE: 25
PIF_VALUE: 1
PIF_VALUE: 27
PIF_VALUE: 30
PIF_VALUE: 27
PIF_VALUE: 27
PIF_VALUE: 29
PIF_VALUE: 28
PIF_VALUE: 27
PIF_VALUE: 26
PIF_VALUE: 26
PIF_VALUE: 28
PIF_VALUE: 26
PIF_VALUE: 28
PIF_VALUE: 28
PIF_VALUE: 27
PIF_VALUE: 29

## 2020-11-12 ASSESSMENT — PAIN SCALES - GENERAL
PAINLEVEL_OUTOF10: 5
PAINLEVEL_OUTOF10: 5
PAINLEVEL_OUTOF10: 10
PAINLEVEL_OUTOF10: 5
PAINLEVEL_OUTOF10: 8
PAINLEVEL_OUTOF10: 9

## 2020-11-12 ASSESSMENT — PAIN DESCRIPTION - PAIN TYPE: TYPE: ACUTE PAIN;SURGICAL PAIN

## 2020-11-12 ASSESSMENT — PAIN DESCRIPTION - LOCATION: LOCATION: ABDOMEN

## 2020-11-12 ASSESSMENT — PAIN DESCRIPTION - ORIENTATION: ORIENTATION: MID;LOWER

## 2020-11-12 ASSESSMENT — PAIN - FUNCTIONAL ASSESSMENT: PAIN_FUNCTIONAL_ASSESSMENT: 0-10

## 2020-11-12 NOTE — ANESTHESIA PROCEDURE NOTES
TAP    Patient location during procedure: post-op  Staffing  Anesthesiologist: Thor Silva MD  Preanesthetic Checklist  Completed: patient identified, site marked, surgical consent, pre-op evaluation, timeout performed, IV checked, risks and benefits discussed, monitors and equipment checked, anesthesia consent given, oxygen available and patient being monitored  Peripheral Block  Patient position: supine  Prep: ChloraPrep  Patient monitoring: cardiac monitor, continuous pulse ox, frequent blood pressure checks and IV access  Block type: TAP  Laterality: bilateral  Injection technique: single-shot  Procedures: ultrasound guided  Local infiltration: bupivacaine  Infiltration strength: 0.5 %  Dose: 3 mL  Provider prep: mask and sterile gloves  Local infiltration: bupivacaine  Needle  Needle type: short-bevel   Needle gauge: 20 G  Needle length: 10 cm  Needle localization: ultrasound guidance  Assessment  Injection assessment: negative aspiration for heme, no paresthesia on injection and local visualized surrounding nerve on ultrasound  Paresthesia pain: none  Slow fractionated injection: yes  Hemodynamics: stable  Additional Notes  Bilateral TAP block with each side receiving  10 ml of  Exparel  10 ml of point 5% Marcaine and 10 ml of NaCl and pt tolerated the block without problems. External Oblique muscle, Internal Oblique muscle, Transversus Abdominis muscle are identified; the tip of the needle and the spread of the local anesthetic between the Internal Oblique muscle and the Transversus Abdominis muscles are visualized. The muscles appeared to be anatomically normal and there were no abnormal pathologically findings seen.          Reason for block: post-op pain management and at surgeon's request

## 2020-11-12 NOTE — H&P
None     Forced sexual activity: None   Other Topics Concern    None   Social History Narrative    None         Medications Prior to Admission:      Prior to Admission medications    Medication Sig Start Date End Date Taking? Authorizing Provider   FEROSUL 325 (65 Fe) MG tablet TK 1 T PO D 9/16/20  Yes Historical Provider, MD   metroNIDAZOLE (FLAGYL) 500 MG tablet Take one pill po bid x 14 days 10/21/20   Petty Pacheco MD   norethindrone (AYGESTIN) 5 MG tablet Take  1 pill po bid x 5 days then one pill qd 10/21/20   Petty Pacheco MD   ferrous sulfate (FE TABS 325) 325 (65 Fe) MG EC tablet Take 1 tablet by mouth 2 times daily 10/21/20   Petty Pacheco MD   vitamin B-12 (CYANOCOBALAMIN) 1000 MCG tablet TAKE ONE TABLET BY MOUTH DAILY 9/16/20   Historical Provider, MD   D3-50 1.25 MG (58945 UT) CAPS TK 1 C PO 1 TIME A WK 9/16/20   Historical Provider, MD   gabapentin (NEURONTIN) 300 MG capsule TK 1 C PO QHS. 7/3/20   Historical Provider, MD   PROCTOZONE-HC 2.5 % CREA rectal cream PETE EXT AA BID 9/16/20   Historical Provider, MD   nortriptyline (PAMELOR) 25 MG capsule TK 1 C PO QHS 9/9/20   Historical Provider, MD   Probiotic Product (PROBIOTIC DAILY) CAPS TK ONE C PO QD 9/16/20   Historical Provider, MD         Allergies:  Patient has no known allergies.     PHYSICAL EXAM:      BP (!) 146/87   Pulse 99   Temp 98.2 °F (36.8 °C) (Oral)   Resp 16   Ht 5' 2\" (1.575 m)   Wt 165 lb (74.8 kg)   LMP 10/13/2020 (Exact Date)   SpO2 96%   BMI 30.18 kg/m²      Airway:  Airway patent with no audible stridor    Heart:  regular rate and rhythm, No murmur noted    Lungs:  No increased work of breathing, good air exchange, clear to auscultation bilaterally, no crackles or wheezing    Abdomen:  soft, non-distended, non-tender, no rebound tenderness or guarding, normal active bowel sounds and no masses palpated    ASSESSMENT AND PLAN     Patient is a 54 y.o. female with above specified procedure planned. 1.  Patient seen and focused exam done today- no new changes since last physical exam on 11/9/20    2. Access to ancillary services are available per request of the provider.     LINDSAY Lowe - WENDY     11/12/2020

## 2020-11-12 NOTE — LETTER
[facility]   Fax   [phone]            Date Of Procedure:  2020     PATIENT:       Carole Gonzalez                    :  1970      No Known Allergies    No.   PHYSICIAN ORDERS                Date / Time of Order:   11/10/20   10:37 AM          Procedure: PLAN FOR TOTAL ABDOMINAL HYSTERECTOMY, BILATERAL SALPINGECTOMIES, left oophorectomy possible right oophorectomy            1. Cefazolin (Ancef) 2 gm IV OCTOR   ** NOTE:  If patient weight is > 120 kg, Administer 3 gm x        2. IF allergic to Penicillin, give          Clindamycin (Cleocin)   900 mg IVPB         3.    IF allergic to Penicillin AND Clindamycin, give          Vancomycin 15 mg per 1 Kg IVPB. Round to the nearest 250 mg         4. Urine Pregnancy Test X   5.   CBC X    Physician / Surgeon:  Andree Lubin MD   Office Ph:  [phone]       Physician Signature:   11/10/20

## 2020-11-12 NOTE — PROGRESS NOTES
Pt arrived asleep restless O2 applied in pacu with nc report from anesthesia.  Dr tSephanie Lowery at bedside Leny Rosado CRNA and transport crna to give tape block

## 2020-11-12 NOTE — PROGRESS NOTES
PACU Transfer to Floor Note    Current Allergies: Patient has no known allergies. Pt meets criteria as per Romie Score and ASPAN Standards to transfer to next phase of care. Temp 97.7  HR 90  /89  SpO2: 98 %    O2 Flow Rate (L/min): 2 L/min      Intake/Output Summary (Last 24 hours) at 11/12/2020 1858  Last data filed at 11/12/2020 1845  Gross per 24 hour   Intake 2818 ml   Output 300 ml   Net 2518 ml       Pain assessment:  present - adequately treated    Pain Level: 5 (IV Toradol Given)    Patient was assessed for alterations to skin integrity. There were no alterations observed. Pt has borrego catheter and abdominal binder.     Handoff report given at bedside to [facility], [facility] updated via phone call with room #      11/12/2020 6:58 PM

## 2020-11-12 NOTE — BRIEF OP NOTE
Brief Postoperative Note      Patient: Marina Portillo  YOB: 1970  MRN: [id number]    Date of Procedure: 11/12/2020    Pre-Op Diagnosis: MENORRHAGIA, FIBROIDS, LEFT OVARIAN CYST     Post-Op Diagnosis: MENORRHAGIA, FIBROIDS, BILATERAL PARATUBAL CYSTS        Procedure(s):  TOTAL ABDOMINAL HYSTERECTOMY BILATERAL SALPINGECTOMIES,    Surgeon(s):  Adolfo Martini MD    Assistant:  Surgical Assistant: Kain Kenney    Anesthesia: General    Estimated Blood Loss (mL): 50 cc     Complications: None    Specimens:   ID Type Source Tests Collected by Time Destination   A : UTERUS, CERVIX, BILATERAL TUBES, RIGHT PERITUBAL CYST Specimen Abdomen SURGICAL PATHOLOGY Petty Harley Morse MD 11/12/2020 1120        Implants:  * No implants in log *      Drains:   Urethral Catheter Latex;Straight-tip 16 fr (Active)       Findings: fibroids uterus, bilateral paratubal cysts, bilateral ovaries normal     Electronically signed by Adolfo Martini MD on 11/12/2020 at 11:56 AM

## 2020-11-12 NOTE — ANESTHESIA POSTPROCEDURE EVALUATION
Department of Anesthesiology  Postprocedure Note    Patient: Herbert Topete  MRN: [id number]  YOB: 1970  Date of evaluation: 11/12/2020  Time:  1:17 PM     Procedure Summary     Date:  11/12/20 Room / Location:  [facility] [address]    Anesthesia Start:  1627 Anesthesia Stop:  9850    Procedure:  TOTAL ABDOMINAL HYSTERECTOMY BILATERAL SALPINGECTOMIES, (Left Abdomen) Diagnosis:  (MENORRHAGIA, FIBROIDS, OVARIAN)    Surgeon:  Mario Bruner MD Responsible Provider:  Burt Severs, MD    Anesthesia Type:  general ASA Status:  1          Anesthesia Type: general    Romie Phase I: Romie Score: 7    Romie Phase II:      Last vitals: Reviewed and per EMR flowsheets.        Anesthesia Post Evaluation    Patient location during evaluation: PACU  Patient participation: complete - patient participated  Level of consciousness: awake  Airway patency: patent  Nausea & Vomiting: no nausea and no vomiting  Complications: no  Cardiovascular status: hemodynamically stable  Respiratory status: nasal cannula  Hydration status: stable

## 2020-11-12 NOTE — PROGRESS NOTES
Call to son (who speaks very good [place]) to update with room #. Family will see patient tomorrow. Phone # for 13year old son:   [phone]    Phone # for  used today: [phone] (Kiera Hargrove)    Patient speaks Niya.

## 2020-11-12 NOTE — ANESTHESIA PRE PROCEDURE
Department of Anesthesiology  Preprocedure Note       Name:  Alex Hernandez   Age:  54 y.o.  :  1970                                          MRN:  [id number]         Date:  2020      Surgeon: David Aguirre):  Dayana Torres MD    Procedure: Procedure(s):  TOTAL ABDOMINAL HYSTERECTOMY BILATERAL SALPINGECTOMIES,  LEFT OOPHORECTOMY, POSSIBLE RIGHT OOPHORECTOMY    Medications prior to admission:   Prior to Admission medications    Medication Sig Start Date End Date Taking?  Authorizing Provider   metroNIDAZOLE (FLAGYL) 500 MG tablet Take one pill po bid x 14 days 10/21/20   Petty Shah MD   norethindrone (AYGESTIN) 5 MG tablet Take  1 pill po bid x 5 days then one pill qd 10/21/20   Petty Shah MD   ferrous sulfate (FE TABS 325) 325 (65 Fe) MG EC tablet Take 1 tablet by mouth 2 times daily 10/21/20   Petty Shah MD   vitamin B-12 (CYANOCOBALAMIN) 1000 MCG tablet TAKE ONE TABLET BY MOUTH DAILY 20   Historical Provider, MD   D3-50 1.25 MG (02768 UT) CAPS TK 1 C PO 1 TIME A WK 20   Historical Provider, MD   FEROSUL 325 (65 Fe) MG tablet TK 1 T PO D 20   Historical Provider, MD   gabapentin (NEURONTIN) 300 MG capsule TK 1 C PO QHS. 7/3/20   Historical Provider, MD   PROCTOZONE-HC 2.5 % CREA rectal cream PETE EXT AA BID 20   Historical Provider, MD   nortriptyline (PAMELOR) 25 MG capsule TK 1 C PO QHS 20   Historical Provider, MD   Probiotic Product (PROBIOTIC DAILY) CAPS TK ONE C PO QD 20   Historical Provider, MD       Current medications:    Current Facility-Administered Medications   Medication Dose Route Frequency Provider Last Rate Last Dose    ceFAZolin (ANCEF) 2 g in dextrose 3 % 50 mL IVPB (duplex)  2 g Intravenous Once Petty Shah MD        sodium chloride flush 0.9 % injection 10 mL  10 mL Intravenous 2 times per day Angela Guerrero DO        sodium chloride flush 0.9 % injection 10 mL  10 mL Intravenous PRN Angela Guerrero DO       [facility] 0.9 % sodium chloride infusion   Intravenous Continuous Philomena Simmering, DO        lactated ringers infusion   Intravenous Continuous Philomena Segalmering, DO        lactated ringers infusion   Intravenous Continuous Bridgett Neri MD        sodium chloride flush 0.9 % injection 10 mL  10 mL Intravenous 2 times per day Bridgett Neri MD        sodium chloride flush 0.9 % injection 10 mL  10 mL Intravenous PRN Bridgett Neri MD        lidocaine PF 1 % injection 1 mL  1 mL Intradermal Once PRN Bridgett Neri MD           Allergies:  No Known Allergies    Problem List:    Patient Active Problem List   Diagnosis Code    Umbilical hernia M26.5       Past Medical History:  No past medical history on file. Past Surgical History:  No past surgical history on file. Social History:    Social History     Tobacco Use    Smoking status: Never Smoker    Smokeless tobacco: Never Used   Substance Use Topics    Alcohol use: Never     Frequency: Never                                Counseling given: Not Answered      Vital Signs (Current): There were no vitals filed for this visit.                                            BP Readings from Last 3 Encounters:   10/21/20 131/80   10/16/20 126/73   10/01/20 129/84       NPO Status:                                                                                 BMI:   Wt Readings from Last 3 Encounters:   10/21/20 165 lb (74.8 kg)   10/16/20 167 lb (75.8 kg)   10/01/20 167 lb (75.8 kg)     There is no height or weight on file to calculate BMI.    CBC:   Lab Results   Component Value Date    WBC 10.2 10/01/2020    RBC 4.47 10/01/2020    HGB 10.1 10/01/2020    HCT 32.2 10/01/2020    MCV 72.0 10/01/2020    RDW 18.4 10/01/2020     10/01/2020       CMP: No results found for: NA, K, CL, CO2, BUN, CREATININE, GFRAA, AGRATIO, LABGLOM, GLUCOSE, PROT, CALCIUM, BILITOT, ALKPHOS, AST, ALT    POC Tests: No results for input(s): POCGLU, POCNA, POCK, POCCL, POCBUN, POCHEMO, POCHCT in the last 72 hours. Coags: No results found for: PROTIME, INR, APTT    HCG (If Applicable):   Lab Results   Component Value Date    PREGTESTUR Negative 11/12/2020        ABGs: No results found for: PHART, PO2ART, XOU9KMK, LCQ1XHH, BEART, L8NTJLVC     Type & Screen (If Applicable):  No results found for: LABABO, LABRH    Drug/Infectious Status (If Applicable):  No results found for: HIV, HEPCAB    COVID-19 Screening (If Applicable):   Lab Results   Component Value Date    COVID19 Not Detected 11/10/2020         Anesthesia Evaluation  Patient summary reviewed and Nursing notes reviewed no history of anesthetic complications:   Airway: Mallampati: II  TM distance: >3 FB   Neck ROM: full  Mouth opening: > = 3 FB Dental: normal exam         Pulmonary:Negative Pulmonary ROS                              Cardiovascular:Negative CV ROS                      Neuro/Psych:   Negative Neuro/Psych ROS              GI/Hepatic/Renal: Neg GI/Hepatic/Renal ROS            Endo/Other: Negative Endo/Other ROS                    Abdominal:           Vascular: negative vascular ROS. Anesthesia Plan      general     ASA 1     (Agrees to TAP block)  Induction: intravenous. Anesthetic plan and risks discussed with patient.                       Patsy Olivares MD   11/12/2020

## 2020-11-13 LAB
ANION GAP SERPL CALCULATED.3IONS-SCNC: 17 MMOL/L (ref 3–16)
BUN BLDV-MCNC: 13 MG/DL (ref 7–20)
CALCIUM SERPL-MCNC: 8.6 MG/DL (ref 8.3–10.6)
CHLORIDE BLD-SCNC: 102 MMOL/L (ref 99–110)
CO2: 24 MMOL/L (ref 21–32)
CREAT SERPL-MCNC: 0.5 MG/DL (ref 0.6–1.1)
GFR AFRICAN AMERICAN: >60
GFR NON-AFRICAN AMERICAN: >60
GLUCOSE BLD-MCNC: 115 MG/DL (ref 70–99)
HCT VFR BLD CALC: 31.1 % (ref 36–48)
HEMOGLOBIN: 10.2 G/DL (ref 12–16)
MCH RBC QN AUTO: 24.3 PG (ref 26–34)
MCHC RBC AUTO-ENTMCNC: 32.7 G/DL (ref 31–36)
MCV RBC AUTO: 74.4 FL (ref 80–100)
PDW BLD-RTO: 23.4 % (ref 12.4–15.4)
PLATELET # BLD: 224 K/UL (ref 135–450)
PMV BLD AUTO: 9.5 FL (ref 5–10.5)
POTASSIUM SERPL-SCNC: 4.5 MMOL/L (ref 3.5–5.1)
RBC # BLD: 4.19 M/UL (ref 4–5.2)
SODIUM BLD-SCNC: 143 MMOL/L (ref 136–145)
WBC # BLD: 8.8 K/UL (ref 4–11)

## 2020-11-13 PROCEDURE — 36415 COLL VENOUS BLD VENIPUNCTURE: CPT

## 2020-11-13 PROCEDURE — 80048 BASIC METABOLIC PNL TOTAL CA: CPT

## 2020-11-13 PROCEDURE — 1200000000 HC SEMI PRIVATE

## 2020-11-13 PROCEDURE — 85027 COMPLETE CBC AUTOMATED: CPT

## 2020-11-13 PROCEDURE — 6360000002 HC RX W HCPCS: Performed by: OBSTETRICS & GYNECOLOGY

## 2020-11-13 RX ADMIN — KETOROLAC TROMETHAMINE 30 MG: 30 INJECTION, SOLUTION INTRAMUSCULAR at 05:40

## 2020-11-13 RX ADMIN — HYDROMORPHONE HYDROCHLORIDE 0.5 MG: 1 INJECTION, SOLUTION INTRAMUSCULAR; INTRAVENOUS; SUBCUTANEOUS at 16:12

## 2020-11-13 RX ADMIN — HYDROMORPHONE HYDROCHLORIDE 0.5 MG: 1 INJECTION, SOLUTION INTRAMUSCULAR; INTRAVENOUS; SUBCUTANEOUS at 20:18

## 2020-11-13 RX ADMIN — HYDROMORPHONE HYDROCHLORIDE 0.5 MG: 1 INJECTION, SOLUTION INTRAMUSCULAR; INTRAVENOUS; SUBCUTANEOUS at 02:36

## 2020-11-13 RX ADMIN — ONDANSETRON 4 MG: 2 INJECTION INTRAMUSCULAR; INTRAVENOUS at 02:36

## 2020-11-13 RX ADMIN — KETOROLAC TROMETHAMINE 30 MG: 30 INJECTION, SOLUTION INTRAMUSCULAR at 23:17

## 2020-11-13 RX ADMIN — KETOROLAC TROMETHAMINE 30 MG: 30 INJECTION, SOLUTION INTRAMUSCULAR at 11:00

## 2020-11-13 RX ADMIN — KETOROLAC TROMETHAMINE 30 MG: 30 INJECTION, SOLUTION INTRAMUSCULAR at 17:35

## 2020-11-13 ASSESSMENT — PAIN SCALES - GENERAL
PAINLEVEL_OUTOF10: 7
PAINLEVEL_OUTOF10: 3
PAINLEVEL_OUTOF10: 6
PAINLEVEL_OUTOF10: 7
PAINLEVEL_OUTOF10: 8
PAINLEVEL_OUTOF10: 6
PAINLEVEL_OUTOF10: 6
PAINLEVEL_OUTOF10: 5
PAINLEVEL_OUTOF10: 6

## 2020-11-13 NOTE — PLAN OF CARE
Problem: Pain:  Goal: Control of acute pain  Description: Control of acute pain  Outcome: Ongoing  Note: Patient continues to complain of back pain and lower abdominal pain. Patient on scheduled Toradol at this time.  Patient able to tell RN when pain medication is needed

## 2020-11-13 NOTE — PROGRESS NOTES
Pt resting comfortably overnight,  phone used throughout night- pt acknowledging pain, PRN dilaudid given per orders w relief. Pt resting w eyes closed. IVF infusing. Briones intact, draining clear yellow urine to gravity. SCD boots on while pt in bed. Pt remains NPO per orders, denies nausea/emesis. Pt not wanting scheduled toradol, pt requesting no more pain meds @ this time. VSS, a/o x4. Afebrile. Surgical site remains clean/dry/intact, abd binder in place for comfort. Bed remains in lowest position, call light at side. Bed alarm engaged, no unsafe movements made. All needs met.  Will cont to monitor

## 2020-11-13 NOTE — PROGRESS NOTES
Pt encouraged/attempted to get OOB, verbalizing too much pain @ this time. Scheduled toradol given, will cont to encourage pt OOB once pain improves.

## 2020-11-13 NOTE — CARE COORDINATION
Case Management Assessment           Initial Evaluation                Date / Time of Evaluation: 11/13/2020 4:15 PM                 Assessment Completed by: Rivera Bonner    Patient Name: Francesca Escalante     YOB: 1970  Diagnosis: S/P DELROY (total abdominal hysterectomy) [Z90.710]     Date / Time: 11/12/2020  7:36 AM    Patient Admission Status: Inpatient    If patient is discharged prior to next notation, then this note serves as note for discharge by case management. Current PCP: Donna Iniguez MD  Clinic Patient: No    Chart Reviewed: Yes  Patient/ Family Interviewed: Yes    Initial assessment completed at bedside with: pt    Hospitalization in the last 30 days: No    Emergency Contacts:  Extended Emergency Contact Information  Primary Emergency Contact: Alba Clinton, [address] Phone: [phone]  Relation: Spouse    Advance Directives:   Code Status: Full [address]: No    Financial  Payor: [address] / Plan: [address] / Product Type: *No Product type* /     Pre-cert required for SNF: Yes    Pharmacy    [facility] [address] [place] [address] [phone]  [address] [place] [address] FOR JOINT DISEASES [id number]  Phone: [phone] Fax: [phone]      Potential assistance Purchasing Medications: Potential Assistance Purchasing Medications: No  Does Patient want to participate in local refill/ meds to beds program?: No    Meds To Beds General Rules:  1. Can ONLY be done Monday- Friday between 8:30am-5pm  2. Prescription(s) must be in pharmacy by 3pm to be filled same day  3. Copy of patient's insurance/ prescription drug card and patient face sheet must be sent along with the prescription(s)  4. Cost of Rx cannot be added to hospital bill. If financial assistance is needed, please contact unit  or ;   or  CANNOT provide pharmacy voucher for patients co-pays  5. Patients can then  the prescription on their way out of the hospital at discharge, or pharmacy can deliver to the bedside if staff is available. (payment due at time of pick-up or delivery - cash, check, or card accepted)     Able to afford home medications/ co-pay costs: Yes    ADLS  Support Systems: Family Members    PT AM-PAC:   /24  OT AM-PAC:   /24    [place]: from home independent  Steps:     Plans to RETURN to current housing: Yes  Barriers to RETURNING to current housing: return GI function pain control    Osmar Drummond 80  Currently ACTIVE with [address]: Yes  [address]: Not Applicable    Currently ACTIVE with [place] on Aging: No      Durable Medical Equipment  DME Provider: none  Equipment:     Home Oxygen and [address] [place] prior to admission: No  [address]: Not Applicable  Other Respiratory Equipment:       Dialysis  Active with HD/PD prior to admission: No  Nephrologist:     HD Center:  Not Applicable    DISCHARGE PLAN:  Disposition: Home- No Services Needed    Transportation PLAN for discharge: family     Factors facilitating achievement of predicted outcomes: Family support, Cooperative and Pleasant    Barriers to discharge: Medical complications    Additional Case Management Notes: pt from home independent PTA, plans to return home no needs anticipated    The Plan for Transition of Care is related to the following treatment goals of S/P DELROY (total abdominal hysterectomy) [Z90.710]    The Patient and/or patient representative Jolynn Daugherty and her family were provided with a choice of provider and agrees with the discharge plan Yes    Freedom of choice list was provided with basic dialogue that supports the patient's individualized plan of care/goals and shares the quality data associated with the providers.  Not Indicated    Care Transition patient: No    Angel Lopez RN  [facility] [employer]  Case Management Department  Ph: [phone]   Fax: [phone]

## 2020-11-13 NOTE — PROGRESS NOTES
late entry- patient seen at 8:45 am today   50 y/o female POD#1  s/p   Total abdominal hysterectomy + bilateral salpingectomies for menorrhagia, history of anemia, left ovarian cyst      S: patient lying comfortably in bed, reports is partially controlled with current regimen of medications,  she has not been ambulating, not passing gas, no bowel movement.  She has an appetite, no nausea, no vomiting      0: temp 98.6 F   BP 125/83, pulse 95  , RR 16, O2 sat 92% room air   Afebrile since admission       Physical Examination   General: alert and oriented x 3, no apparent distress   CVS: regular rate and rhythm  Lungs: clear to ausculation bilaterally  Abdomen: soft,  minimal  bowel sounds present , appropriate tenderness to palpation  Incisions: clean and dry and intact  Ext: SCD, no pedal edema     Input: total -  4173 cc - 4173 IV + 80 cc oral   Output: total - 725 cc  urine      Pre-op hemoglobin 11.2  Post-op hemoglobin 10.2   WBC - 8.8   Serum creatinine 0.5        Assessment/Plan  50 y/o female POD#1  s/p   Total abdominal hysterectomy + bilateral salpingectomies for menorrhagia, history of anemia, left ovarian cyst     Advance diet, continue ambulation, d/c borrego today, Continue routine post-op care

## 2020-11-13 NOTE — PLAN OF CARE
Problem: Pain:  Goal: Pain level will decrease  Description: Pain level will decrease  Outcome: Ongoing  Note: Pt encouraged to use call light to notify staff when pain rises beyond tolerable. Pt educated on pain scale and was using call light appropriately. Pt verbalizing a lot of pain @ surgical site, PRN dilaudid given w relief. Ice over site for comfort. Abd binder remains in place. Pt appears to be resting comfortably w eyes closed @ this time. Problem: Falls - Risk of:  Goal: Will remain free from falls  Description: Will remain free from falls  Outcome: Ongoing  Note: Pt is A&Ox4, is high fall risk w recent surgery, Pt educated and encouraged to use call light to notify and wait for assistance from staff before getting OOB, pt uses call light appropriately, has made no unsafe movements, no attempts to get OOB without assistance. Pt's bed alarm remained on throughout shift, bed in lowest position, 2/4 side rails up, call light and bedside table within reach. No falls so far this shift, will continue to monitor.

## 2020-11-14 ENCOUNTER — APPOINTMENT (OUTPATIENT)
Dept: CT IMAGING | Age: 50
DRG: 519 | End: 2020-11-14
Attending: OBSTETRICS & GYNECOLOGY
Payer: MEDICAID

## 2020-11-14 ENCOUNTER — APPOINTMENT (OUTPATIENT)
Dept: GENERAL RADIOLOGY | Age: 50
DRG: 519 | End: 2020-11-14
Attending: OBSTETRICS & GYNECOLOGY
Payer: MEDICAID

## 2020-11-14 LAB
ANION GAP SERPL CALCULATED.3IONS-SCNC: 10 MMOL/L (ref 3–16)
BASOPHILS ABSOLUTE: 0 K/UL (ref 0–0.2)
BASOPHILS RELATIVE PERCENT: 0.4 %
BUN BLDV-MCNC: 7 MG/DL (ref 7–20)
CALCIUM SERPL-MCNC: 8.8 MG/DL (ref 8.3–10.6)
CHLORIDE BLD-SCNC: 99 MMOL/L (ref 99–110)
CO2: 26 MMOL/L (ref 21–32)
CREAT SERPL-MCNC: 0.6 MG/DL (ref 0.6–1.1)
D DIMER: 805 NG/ML DDU (ref 0–229)
EOSINOPHILS ABSOLUTE: 0.1 K/UL (ref 0–0.6)
EOSINOPHILS RELATIVE PERCENT: 1.1 %
GFR AFRICAN AMERICAN: >60
GFR NON-AFRICAN AMERICAN: >60
GLUCOSE BLD-MCNC: 116 MG/DL (ref 70–99)
HCT VFR BLD CALC: 28 % (ref 36–48)
HCT VFR BLD CALC: 30.1 % (ref 36–48)
HEMOGLOBIN: 8.7 G/DL (ref 12–16)
HEMOGLOBIN: 9.4 G/DL (ref 12–16)
LYMPHOCYTES ABSOLUTE: 1.6 K/UL (ref 1–5.1)
LYMPHOCYTES RELATIVE PERCENT: 17.4 %
MCH RBC QN AUTO: 23.9 PG (ref 26–34)
MCHC RBC AUTO-ENTMCNC: 31.3 G/DL (ref 31–36)
MCV RBC AUTO: 76.2 FL (ref 80–100)
MONOCYTES ABSOLUTE: 0.5 K/UL (ref 0–1.3)
MONOCYTES RELATIVE PERCENT: 5.4 %
NEUTROPHILS ABSOLUTE: 7.1 K/UL (ref 1.7–7.7)
NEUTROPHILS RELATIVE PERCENT: 75.7 %
PDW BLD-RTO: 23.2 % (ref 12.4–15.4)
PLATELET # BLD: 230 K/UL (ref 135–450)
PMV BLD AUTO: 9 FL (ref 5–10.5)
POTASSIUM SERPL-SCNC: 3.5 MMOL/L (ref 3.5–5.1)
RBC # BLD: 3.95 M/UL (ref 4–5.2)
SODIUM BLD-SCNC: 135 MMOL/L (ref 136–145)
WBC # BLD: 9.4 K/UL (ref 4–11)

## 2020-11-14 PROCEDURE — 85379 FIBRIN DEGRADATION QUANT: CPT

## 2020-11-14 PROCEDURE — 80048 BASIC METABOLIC PNL TOTAL CA: CPT

## 2020-11-14 PROCEDURE — 6360000004 HC RX CONTRAST MEDICATION: Performed by: INTERNAL MEDICINE

## 2020-11-14 PROCEDURE — 6370000000 HC RX 637 (ALT 250 FOR IP): Performed by: INTERNAL MEDICINE

## 2020-11-14 PROCEDURE — 71275 CT ANGIOGRAPHY CHEST: CPT

## 2020-11-14 PROCEDURE — 71045 X-RAY EXAM CHEST 1 VIEW: CPT

## 2020-11-14 PROCEDURE — 2580000003 HC RX 258: Performed by: OBSTETRICS & GYNECOLOGY

## 2020-11-14 PROCEDURE — 85025 COMPLETE CBC W/AUTO DIFF WBC: CPT

## 2020-11-14 PROCEDURE — 83036 HEMOGLOBIN GLYCOSYLATED A1C: CPT

## 2020-11-14 PROCEDURE — 36415 COLL VENOUS BLD VENIPUNCTURE: CPT

## 2020-11-14 PROCEDURE — 85014 HEMATOCRIT: CPT

## 2020-11-14 PROCEDURE — 6370000000 HC RX 637 (ALT 250 FOR IP): Performed by: OBSTETRICS & GYNECOLOGY

## 2020-11-14 PROCEDURE — 85018 HEMOGLOBIN: CPT

## 2020-11-14 PROCEDURE — 6360000002 HC RX W HCPCS: Performed by: OBSTETRICS & GYNECOLOGY

## 2020-11-14 PROCEDURE — 1200000000 HC SEMI PRIVATE

## 2020-11-14 PROCEDURE — 2580000003 HC RX 258: Performed by: INTERNAL MEDICINE

## 2020-11-14 RX ORDER — 0.9 % SODIUM CHLORIDE 0.9 %
500 INTRAVENOUS SOLUTION INTRAVENOUS ONCE
Status: COMPLETED | OUTPATIENT
Start: 2020-11-14 | End: 2020-11-14

## 2020-11-14 RX ORDER — GABAPENTIN 300 MG/1
300 CAPSULE ORAL 3 TIMES DAILY
Status: DISCONTINUED | OUTPATIENT
Start: 2020-11-14 | End: 2020-11-15 | Stop reason: HOSPADM

## 2020-11-14 RX ORDER — DOCUSATE SODIUM 100 MG/1
100 CAPSULE, LIQUID FILLED ORAL 2 TIMES DAILY PRN
Status: DISCONTINUED | OUTPATIENT
Start: 2020-11-14 | End: 2020-11-15 | Stop reason: HOSPADM

## 2020-11-14 RX ORDER — OXYCODONE HYDROCHLORIDE AND ACETAMINOPHEN 5; 325 MG/1; MG/1
1 TABLET ORAL EVERY 4 HOURS PRN
Status: DISCONTINUED | OUTPATIENT
Start: 2020-11-14 | End: 2020-11-15 | Stop reason: HOSPADM

## 2020-11-14 RX ORDER — FERROUS SULFATE 325(65) MG
325 TABLET ORAL 2 TIMES DAILY WITH MEALS
Status: DISCONTINUED | OUTPATIENT
Start: 2020-11-15 | End: 2020-11-15 | Stop reason: HOSPADM

## 2020-11-14 RX ORDER — IBUPROFEN 200 MG
800 TABLET ORAL
Status: DISCONTINUED | OUTPATIENT
Start: 2020-11-14 | End: 2020-11-15 | Stop reason: HOSPADM

## 2020-11-14 RX ORDER — SODIUM CHLORIDE 9 MG/ML
INJECTION, SOLUTION INTRAVENOUS CONTINUOUS
Status: DISCONTINUED | OUTPATIENT
Start: 2020-11-14 | End: 2020-11-15

## 2020-11-14 RX ADMIN — IOPAMIDOL 80 ML: 755 INJECTION, SOLUTION INTRAVENOUS at 15:38

## 2020-11-14 RX ADMIN — SODIUM CHLORIDE: 9 INJECTION, SOLUTION INTRAVENOUS at 16:59

## 2020-11-14 RX ADMIN — IBUPROFEN 800 MG: 200 TABLET, FILM COATED ORAL at 16:59

## 2020-11-14 RX ADMIN — SODIUM CHLORIDE 500 ML: 9 INJECTION, SOLUTION INTRAVENOUS at 16:59

## 2020-11-14 RX ADMIN — Medication 10 ML: at 12:01

## 2020-11-14 RX ADMIN — GABAPENTIN 300 MG: 300 CAPSULE ORAL at 16:58

## 2020-11-14 RX ADMIN — GABAPENTIN 300 MG: 300 CAPSULE ORAL at 20:40

## 2020-11-14 RX ADMIN — KETOROLAC TROMETHAMINE 30 MG: 30 INJECTION, SOLUTION INTRAMUSCULAR at 12:01

## 2020-11-14 ASSESSMENT — PAIN SCALES - GENERAL
PAINLEVEL_OUTOF10: 3
PAINLEVEL_OUTOF10: 4

## 2020-11-14 ASSESSMENT — PAIN DESCRIPTION - LOCATION: LOCATION: ABDOMEN

## 2020-11-14 ASSESSMENT — PAIN DESCRIPTION - ORIENTATION: ORIENTATION: MID;LOWER

## 2020-11-14 ASSESSMENT — PAIN DESCRIPTION - PAIN TYPE: TYPE: SURGICAL PAIN

## 2020-11-14 NOTE — PROGRESS NOTES
Pt has been tachycardic during this shift. Dr. Pranay Bae made aware. STAT EKG and labs order. Pt asymptomatic at this time. VS otherwise stable. Will continue to monitor.  Electronically signed by Josr Reese RN on 11/14/2020 at 3:01 PM

## 2020-11-14 NOTE — PLAN OF CARE
Problem: Pain:  Goal: Pain level will decrease  Description: Pain level will decrease  Outcome: Ongoing  Note: Pt encouraged to use call light to notify staff when pain rises beyond tolerable. Pt educated on pain scale and was using call light appropriately. Pain controlled w scheduled toradol, PRN dilaudid given for breakthrough pain. Pt satisfied, no adverse effects noted.

## 2020-11-14 NOTE — PROGRESS NOTES
50 y/o female POD#2  s/p   Total abdominal hysterectomy + bilateral salpingectomies for menorrhagia, history of anemia, left ovarian cyst      S: patient lying comfortably in bed, reports is partially controlled with current regimen of medications,  she has  been ambulating, passing gas, no bowel movement. She tolerated a regular diet this morning. No nausea, no vomiting     0: temp 98.0 F   BP 139/77 , pulse 116   , RR 16, O2 sat 96% room air   Afebrile since admission       Physical Examination   General: alert and oriented x 3, no apparent distress   CVS: regular rate and rhythm  Lungs: clear to ausculation bilaterally  Abdomen: soft,  minimal  bowel sounds present , appropriate tenderness to palpation  Incisions: clean and dry and intact  Ext: SCD, no pedal edema     Input: total - 3231  cc - 2321  IV + 910  cc oral   Output: total - [id number] cc  urine      Pre-op hemoglobin 11.2  Post-op hemoglobin 10.2   WBC - 8.8   Serum creatinine 0.5        Assessment/Plan  50 y/o female POD#2  s/p   Total abdominal hysterectomy + bilateral salpingectomies for menorrhagia, history of anemia, left ovarian cyst      Continue routine post-op care    Increased heart rate - will monitor closely- she is asymptomatic.  Recheck CBC

## 2020-11-14 NOTE — CONSULTS
(NEURONTIN) 300 MG capsule TK 1 C PO QHS. 7/3/20   Historical Provider, MD   PROCTOZONE-HC 2.5 % CREA rectal cream PETE EXT AA BID 9/16/20   Historical Provider, MD   nortriptyline (PAMELOR) 25 MG capsule TK 1 C PO QHS 9/9/20   Historical Provider, MD   Probiotic Product (PROBIOTIC DAILY) CAPS TK ONE C PO QD 9/16/20   Historical Provider, MD       Allergies:  Patient has no known allergies. Social History:       TOBACCO:   reports that she has never smoked. She has never used smokeless tobacco.  ETOH:   reports no history of alcohol use. Family History:     Reviewed in detail and negative and noncontributory    History reviewed. No pertinent family history. REVIEW OF SYSTEMS:   Pertinent positives as noted in the HPI. All other systems reviewed and negative. PHYSICAL EXAM PERFORMED:  /84   Pulse 109   Temp 98.2 °F (36.8 °C) (Oral)   Resp 16   Ht 5' 2\" (1.575 m)   Wt 165 lb (74.8 kg)   SpO2 96%   BMI 30.18 kg/m²   General appearance: No apparent distress, patient is cooperative. HEENT: Normal cephalic, atraumatic without obvious deformity. Conjunctivae/corneas clear. Neck: Supple, with full range of motion. Respiratory:  Normal respiratory effort. Clear to auscultation, bilaterally without Rales/Wheezes/Rhonchi. Cardiovascular: Regular rate and rhythm with normal S1/S2 without murmurs, rubs or gallops. Abdomen: Soft, non-tender, non-distended, normal bowel sounds. Musculoskeletal: No cyanosis or edema bilaterally. Skin:  No rashes or lesions grossly visible  Neurologic:  Neurologically intact without any focal sensory/motor deficits.  grossly non-focal.  Psychiatric: Alert and oriented, normal mood  Peripheral Pulses: +2 palpable, equal bilaterally     Labs:     Recent Labs     11/12/20  0810 11/13/20  0533 11/14/20  1244   WBC 7.5 8.8 9.4   HGB 11.2* 10.2* 9.4*   HCT 35.8* 31.1* 30.1*    224 230     Recent Labs     11/13/20  0533 11/14/20  1244    135*   K 4.5 3.5   CL 102 99   CO2 24 26   BUN 13 7   CREATININE 0.5* 0.6   CALCIUM 8.6 8.8     No results for input(s): AST, ALT, BILIDIR, BILITOT, ALKPHOS in the last 72 hours. No results for input(s): INR in the last 72 hours. No results for input(s): Peterson Breeze in the last 72 hours. Urinalysis:  No results found for: Loma Cram, BACTERIA, RBCUA, BLOODU, [address], [address]    Radiology:     CTA PULMONARY W CONTRAST   Final Result      1. No evidence of pulmonary embolism. 2.  No acute process in the chest.         XR CHEST 1 VIEW   Final Result      1. Some minimal linear changes perihilar and suprahilar lung could represent some atelectasis or early airspace disease   2. Normal heart size. No pneumothorax. @ekgread@      ASSESSMENT:    Active Hospital Problems    Diagnosis Date Noted    Menorrhagia with regular cycle [N92.0]     Fibroids [D21.9]     Iron deficiency anemia [D50.9]     S/P DELROY (total abdominal hysterectomy) [Z90.710] 11/12/2020       PLAN:    Patient is a 82-year-old female with past medical history of uterine fibroids, menorrhagia presented to hospital for elective procedure with total abdominal hysterectomy, bilateral salpingectomies, left oophorectomy. Patient tolerated procedure well. Postprocedure patient was noted to have elevated heart rate, patient was also noted to have elevated D-dimers. Patient denies chest pain shortness of breath nausea vomiting diarrhea constipation dysuria. Patient mention she has mild abdominal pain at the site of surgery otherwise denies any other complaints at this time. Assessment  Sinus tachycardia likely secondary to multifactorial surgical pain, mild dehydration  Mild hyponatremia  Hyperglycemia      Plan  CTA PE was checked and negative for PE, unremarkable for any acute pulmonary abnormality  Started on IV fluid therapy, pain control  Check BMP in a.m.   Check A1c  DVT prophylaxis-per primary  I reviewed patient's medications reconciliation, EKG, radiology, previous medical record  stool softer  PT/OT   DVT Prophylaxis:   Diet: DIET GENERAL;  Code Status: Full Code    PT/OT Eval Status: Active and ongoing    Dispo.  Discharge planning per primary team, stable for medical discharge tomorrow from medical standpoint    Thank you for the consultation, will follow up as needed    Fabio Sandoval MD

## 2020-11-14 NOTE — OP NOTE
[facility]               [address]                                OPERATIVE REPORT    PATIENT NAME: Poornima Dawkins                       :        1970  MED REC NO:   [id number]                          ROOM:       5309  ACCOUNT NO:   [de-identified]                           ADMIT DATE: 2020  PROVIDER:     Roberto Ahmadi MD    DATE OF PROCEDURE:  2020    PREOPERATIVE DIAGNOSES:  1. Menorrhagia. 2.  Fibroids. 3.  Anemia. 4.  Left ovarian cyst.    POSTOPERATIVE DIAGNOSES:  1. Menorrhagia. 2.  Fibroids. 3.  Anemia. 4.  Bilateral paratubal cysts. OPERATIONS PERFORMED:  Total abdominal hysterectomy with bilateral  salpingectomies. SURGEON:  Roberto Ahmadi MD    ASSISTANT:  Rylee Gao. ANESTHESIA:  General.    ESTIMATED BLOOD LOSS:  50 mL. FINDINGS:  Upon laparotomy, the patient was noted to have a large  fibroid uterus. Bilateral ovaries were noted to be normal.  She was  noted to have bilateral small paratubal cysts. COUNTS:  Correct x3. COMPLICATIONS:  None. ANTIBIOTICS:  2 gm of Ancef preoperatively. INDICATIONS:  The patient is a 72-year-old female  2, para 2, who  presented to the office and reported longstanding history of heavy  painful menses. She had a pelvic ultrasound done which showed an  enlarged uterus with fibroids. She also had findings of a 4-cm left  ovarian cyst.  The patient was given treatment options for her symptoms. She also had history of anemia with an initial hemoglobin of 10. The  patient elected for surgical intervention and decided to go with  definitive treatment, i.e., a hysterectomy. Prior to the surgery, the  risks, benefits, and alternatives of the surgery including risks of  bleeding; infection; pain; possible injury to the uterus, bowel,  bladder, ureter, or any other adjacent organ were discussed with the  patient.   If injury were to occur to an organ system, the need for  further surgery discussed. Also, risk of anesthesia, possibility of  blood clots, and possibility of a blood transfusion were discussed with  the patient. She verbalized understanding of all these risks and signed  an informed consent in the office. The patient also knew that after  hysterectomy, she would be sterile and not be able to have further  pregnancies. PROCEDURE NOTE:  On the day of the surgery, the patient was taken to the  operating room with a running IV. She was then placed under general  anesthesia without difficulty. Next, she was placed in dorsal lithotomy  position and exam under anesthesia was done which revealed a 14-week  sized, enlarged fibroid uterus. Next, the patient was prepped and  draped in normal sterile fashion. A Briones catheter was placed. A  Pfannenstiel skin incision was made and this was carried down to the  underlying layer of fascia with the Bovie. Next, the fascia was incised  in the midline with the Bovie and the fascial incision was extended  laterally with Baer scissors. The superior aspect of the fascial  incision was then grasped with Kocher clamps, elevated, and dissected  off the rectus muscle with the Bovie. Similarly, the inferior aspect of  the fascial incision was grasped with Kocher clamps, elevated, and  dissected off the rectus muscle with the Bovie. Next, the rectus muscle  was  in the midline and the peritoneum was identified and  tented up with two hemostats. The peritoneum was entered carefully  using Metzenbaum scissors. Upon entering the peritoneum, the incision was extended superiorly and  inferiorly with Metzenbaum scissors. Upon exploration of the abdomen,  the above findings were noted. Next, an O'Nikolay-O'Cuevas retractor  was placed inside the incision and the bowel was packed back with moist  laparotomy sponges. The patient was placed in Trendelenburg  position.   The uterus was tented up to be hemostatic. Next, the ureters on both  sides were examined and there was good peristalsis noted. At this  point, there was mild oozing noted from the cuff. As such, Surgicel was applied to  the vaginal cuff which led to excellent hemostasis. All sponges were  then removed from the patient's abdomen including the retractors. The  lap count was correct x1 at this part of the surgery. Next, the  peritoneum was closed with 3-0 Vicryl. The fascia was closed with 0  Vicryls and the subcutaneous layer was closed with 3-0 Vicryl and the  skin was closed with 4-0 Monocryls and Dermabond. At the end of the  procedure, all sponge, lap, and needle counts were correct x3. The  patient was awakened from anesthesia and taken to Recovery.         Yanelis Malone MD    D: 11/13/2020 19:38:25       T: 11/13/2020 20:50:28     [id number]  Job#: [id number]     Doc#: [id number]    CC:

## 2020-11-15 VITALS
RESPIRATION RATE: 16 BRPM | HEIGHT: 62 IN | HEART RATE: 98 BPM | OXYGEN SATURATION: 97 % | BODY MASS INDEX: 30.36 KG/M2 | SYSTOLIC BLOOD PRESSURE: 130 MMHG | WEIGHT: 165 LBS | TEMPERATURE: 98 F | DIASTOLIC BLOOD PRESSURE: 82 MMHG

## 2020-11-15 LAB
ANION GAP SERPL CALCULATED.3IONS-SCNC: 10 MMOL/L (ref 3–16)
BASOPHILS ABSOLUTE: 0 K/UL (ref 0–0.2)
BASOPHILS RELATIVE PERCENT: 0.4 %
BUN BLDV-MCNC: 7 MG/DL (ref 7–20)
CALCIUM SERPL-MCNC: 8.4 MG/DL (ref 8.3–10.6)
CHLORIDE BLD-SCNC: 104 MMOL/L (ref 99–110)
CO2: 24 MMOL/L (ref 21–32)
CREAT SERPL-MCNC: 0.5 MG/DL (ref 0.6–1.1)
EKG ATRIAL RATE: 114 BPM
EKG DIAGNOSIS: NORMAL
EKG P AXIS: 56 DEGREES
EKG P-R INTERVAL: 140 MS
EKG Q-T INTERVAL: 314 MS
EKG QRS DURATION: 88 MS
EKG QTC CALCULATION (BAZETT): 432 MS
EKG R AXIS: 53 DEGREES
EKG T AXIS: 30 DEGREES
EKG VENTRICULAR RATE: 114 BPM
EOSINOPHILS ABSOLUTE: 0.3 K/UL (ref 0–0.6)
EOSINOPHILS RELATIVE PERCENT: 2.5 %
GFR AFRICAN AMERICAN: >60
GFR NON-AFRICAN AMERICAN: >60
GLUCOSE BLD-MCNC: 114 MG/DL (ref 70–99)
HCT VFR BLD CALC: 28.6 % (ref 36–48)
HEMOGLOBIN: 9 G/DL (ref 12–16)
LYMPHOCYTES ABSOLUTE: 1.5 K/UL (ref 1–5.1)
LYMPHOCYTES RELATIVE PERCENT: 13.7 %
MCH RBC QN AUTO: 24 PG (ref 26–34)
MCHC RBC AUTO-ENTMCNC: 31.5 G/DL (ref 31–36)
MCV RBC AUTO: 76.2 FL (ref 80–100)
MONOCYTES ABSOLUTE: 0.7 K/UL (ref 0–1.3)
MONOCYTES RELATIVE PERCENT: 6 %
NEUTROPHILS ABSOLUTE: 8.4 K/UL (ref 1.7–7.7)
NEUTROPHILS RELATIVE PERCENT: 77.4 %
PDW BLD-RTO: 23.2 % (ref 12.4–15.4)
PLATELET # BLD: 207 K/UL (ref 135–450)
PMV BLD AUTO: 9.1 FL (ref 5–10.5)
POTASSIUM SERPL-SCNC: 3.8 MMOL/L (ref 3.5–5.1)
RBC # BLD: 3.75 M/UL (ref 4–5.2)
SODIUM BLD-SCNC: 138 MMOL/L (ref 136–145)
WBC # BLD: 10.9 K/UL (ref 4–11)

## 2020-11-15 PROCEDURE — 93010 ELECTROCARDIOGRAM REPORT: CPT | Performed by: INTERNAL MEDICINE

## 2020-11-15 PROCEDURE — 6370000000 HC RX 637 (ALT 250 FOR IP): Performed by: INTERNAL MEDICINE

## 2020-11-15 PROCEDURE — 6370000000 HC RX 637 (ALT 250 FOR IP): Performed by: OBSTETRICS & GYNECOLOGY

## 2020-11-15 PROCEDURE — 80048 BASIC METABOLIC PNL TOTAL CA: CPT

## 2020-11-15 PROCEDURE — 85025 COMPLETE CBC W/AUTO DIFF WBC: CPT

## 2020-11-15 PROCEDURE — 36415 COLL VENOUS BLD VENIPUNCTURE: CPT

## 2020-11-15 PROCEDURE — 93005 ELECTROCARDIOGRAM TRACING: CPT | Performed by: INTERNAL MEDICINE

## 2020-11-15 RX ORDER — PSEUDOEPHEDRINE HCL 30 MG
100 TABLET ORAL 2 TIMES DAILY PRN
Qty: 60 CAPSULE | Refills: 0 | Status: SHIPPED | OUTPATIENT
Start: 2020-11-15 | End: 2021-01-05

## 2020-11-15 RX ORDER — OXYCODONE HYDROCHLORIDE AND ACETAMINOPHEN 5; 325 MG/1; MG/1
1 TABLET ORAL EVERY 6 HOURS PRN
Qty: 30 TABLET | Refills: 0 | Status: SHIPPED | OUTPATIENT
Start: 2020-11-15 | End: 2020-11-22

## 2020-11-15 RX ORDER — IBUPROFEN 800 MG/1
800 TABLET ORAL EVERY 8 HOURS PRN
Qty: 60 TABLET | Refills: 0 | Status: SHIPPED | OUTPATIENT
Start: 2020-11-15 | End: 2021-01-05

## 2020-11-15 RX ORDER — FERROUS SULFATE 325(65) MG
TABLET ORAL
Qty: 30 TABLET | Refills: 3 | Status: SHIPPED | OUTPATIENT
Start: 2020-11-15 | End: 2020-12-01 | Stop reason: SDUPTHER

## 2020-11-15 RX ORDER — FERROUS SULFATE 325(65) MG
325 TABLET ORAL 2 TIMES DAILY WITH MEALS
Qty: 30 TABLET | Refills: 3 | Status: SHIPPED | OUTPATIENT
Start: 2020-11-15 | End: 2020-12-01 | Stop reason: SDUPTHER

## 2020-11-15 RX ADMIN — GABAPENTIN 300 MG: 300 CAPSULE ORAL at 10:41

## 2020-11-15 RX ADMIN — IBUPROFEN 800 MG: 200 TABLET, FILM COATED ORAL at 10:41

## 2020-11-15 RX ADMIN — FERROUS SULFATE TAB 325 MG (65 MG ELEMENTAL FE) 325 MG: 325 (65 FE) TAB at 10:41

## 2020-11-15 ASSESSMENT — PAIN SCALES - GENERAL
PAINLEVEL_OUTOF10: 4
PAINLEVEL_OUTOF10: 2

## 2020-11-15 ASSESSMENT — PAIN DESCRIPTION - PAIN TYPE: TYPE: SURGICAL PAIN

## 2020-11-15 ASSESSMENT — PAIN DESCRIPTION - ORIENTATION: ORIENTATION: MID

## 2020-11-15 ASSESSMENT — PAIN DESCRIPTION - DESCRIPTORS: DESCRIPTORS: SORE

## 2020-11-15 ASSESSMENT — PAIN DESCRIPTION - FREQUENCY: FREQUENCY: CONTINUOUS

## 2020-11-15 ASSESSMENT — PAIN DESCRIPTION - PROGRESSION: CLINICAL_PROGRESSION: NOT CHANGED

## 2020-11-15 ASSESSMENT — PAIN - FUNCTIONAL ASSESSMENT: PAIN_FUNCTIONAL_ASSESSMENT: ACTIVITIES ARE NOT PREVENTED

## 2020-11-15 ASSESSMENT — PAIN DESCRIPTION - LOCATION: LOCATION: ABDOMEN

## 2020-11-15 ASSESSMENT — PAIN DESCRIPTION - ONSET: ONSET: GRADUAL

## 2020-11-15 NOTE — PROGRESS NOTES
Patient was given discharge instructions with a  on the phone. Patient acknowledged understanding of the instructions and medications and side effects. Patient was taken via wheelchair to private vehicle for discharge home.

## 2020-11-15 NOTE — PROGRESS NOTES
Pt A&O, VSS-still remains tachycardic at rest. Abd incision CDI. IVF infusing. Voiding adequately. Tolerating PO intake well. Ambulating independently. Has been resting comfortably in bed. Denies acute pain. No other requests at this time, will continue to monitor.

## 2020-11-15 NOTE — PROGRESS NOTES
Hospitalist Progress Note      PCP: Michael Esparza MD    Chief Complaint. Patient is a 77-year-old female with past medical history of uterine fibroids, menorrhagia presented to hospital for elective procedure with total abdominal hysterectomy, bilateral salpingectomies, left oophorectomy. Patient tolerated procedure well. Postprocedure patient was noted to have elevated heart rate, patient was also noted to have elevated D-dimers. Patient denies chest pain shortness of breath nausea vomiting diarrhea constipation dysuria. Patient mention she has mild abdominal pain at the site of surgery otherwise denies any other complaints at this time. Date of Admission: 11/12/2020      Subjective:   denies chest pain, nausea, vomiting, shortness of breath, fever or chills. mention feels overall better    Medications:  Reviewed    Infusion Medications   Scheduled Medications    ibuprofen  800 mg Oral TID WC    gabapentin  300 mg Oral TID    ferrous sulfate  325 mg Oral BID WC     PRN Meds: oxyCODONE-acetaminophen, docusate sodium, sodium chloride flush, promethazine **OR** ondansetron      Intake/Output Summary (Last 24 hours) at 11/15/2020 1500  Last data filed at 11/15/2020 1212  Gross per 24 hour   Intake 480 ml   Output 1150 ml   Net -670 ml       Physical Exam Performed:    /82   Pulse 98   Temp 98 °F (36.7 °C) (Oral)   Resp 16   Ht 5' 2\" (1.575 m)   Wt 165 lb (74.8 kg)   SpO2 97%   BMI 30.18 kg/m²     General appearance: No apparent distress  HEENT:  Conjunctivae/corneas clear. Neck: Supple, with full range of motion. Respiratory:  Normal respiratory effort. Clear to auscultation, bilaterally without Rales/Wheezes/Rhonchi. Cardiovascular: Regular rate and rhythm with normal S1/S2 without murmurs or rubs  Abdomen: Soft, non-tender, non-distended, normal bowel sounds. Musculoskeletal: No cyanosis or edema bilaterally  Neurologic:  without any focal sensory/motor deficits.  grossly non-focal.  Psychiatric: Alert and oriented, Normal mood  Peripheral Pulses: +2 palpable, equal bilaterally       Labs:   Recent Labs     11/13/20  0533 11/14/20  1244 11/14/20  2133 11/15/20  0550   WBC 8.8 9.4  --  10.9   HGB 10.2* 9.4* 8.7* 9.0*   HCT 31.1* 30.1* 28.0* 28.6*    230  --  207     Recent Labs     11/13/20  0533 11/14/20  1244 11/15/20  0550    135* 138   K 4.5 3.5 3.8    99 104   CO2 24 26 24   BUN 13 7 7   CREATININE 0.5* 0.6 0.5*   CALCIUM 8.6 8.8 8.4     No results for input(s): AST, ALT, BILIDIR, BILITOT, ALKPHOS in the last 72 hours. No results for input(s): INR in the last 72 hours. No results for input(s): Judd Jolene in the last 72 hours. Urinalysis:    No results found for: Madeline Points, BACTERIA, RBCUA, BLOODU, [address], [address]    Radiology:  CTA PULMONARY W CONTRAST   Final Result      1. No evidence of pulmonary embolism. 2.  No acute process in the chest.         XR CHEST 1 VIEW   Final Result      1. Some minimal linear changes perihilar and suprahilar lung could represent some atelectasis or early airspace disease   2. Normal heart size. No pneumothorax. Assessment/Plan:    Active Hospital Problems    Diagnosis    Menorrhagia with regular cycle [N92.0]    Fibroids [D21.9]    Iron deficiency anemia [D50.9]    S/P DELROY (total abdominal hysterectomy) [Z90.710]       Patient is a 61-year-old female with past medical history of uterine fibroids, menorrhagia presented to hospital for elective procedure with total abdominal hysterectomy, bilateral salpingectomies, left oophorectomy. Patient tolerated procedure well. Postprocedure patient was noted to have elevated heart rate, patient was also noted to have elevated D-dimers. Patient denies chest pain shortness of breath nausea vomiting diarrhea constipation dysuria.   Patient mention she has mild abdominal pain at the site of surgery otherwise denies any other complaints at this time.     Assessment  Sinus tachycardia likely secondary to multifactorial surgical pain, mild dehydration  Mild hyponatremia  Hyperglycemia        Plan  CTA PE was checked and negative for PE, unremarkable for any acute pulmonary abnormality  Started on IV fluid therapy, pain control  DVT prophylaxis-per primary  I reviewed patient's medications reconciliation, EKG, radiology, previous medical record  stool softer  Diet: DIET GENERAL;  Code Status: Full Code    PT/OT Eval Status: ordered    [address] for DC from bob Askew MD

## 2020-11-15 NOTE — PROGRESS NOTES
Patient is alert and oriented times 4 with stable VS.  Patient is tolerating diet well. Pain controlled with medication. Pt is ambulating. Incision is clean dry and intact. Plan is for discharge.

## 2020-11-15 NOTE — CARE COORDINATION
Case Management Assessment            Discharge Note                    Date / Time of Note: 11/15/2020 9:05 AM                  Discharge Note Completed by: Cherelle Ahumada    Patient Name: Carole Gonzalez   YOB: 1970  Diagnosis: S/P DELROY (total abdominal hysterectomy) [Z90.710]   Date / Time: 11/12/2020  7:36 AM    Current PCP: Lalo Piedra MD  Clinic patient: No    Hospitalization in the last 30 days: No    Advance Directives:  Code Status: Full Code  [place] DNR form completed and on chart: No    Financial:  Payor: Marcelino Rodriguez / Plan: Marcelino Glieva / Product Type: *No Product type* /      Pharmacy:    Zeinab Kapoor [address] [place] [address] [phone]  [address] [place] [id number]  Phone: [phone] Fax: [phone]      Assistance purchasing medications?: Potential Assistance Purchasing Medications: No  Assistance provided by Case Management: None at this time    Does patient want to participate in local refill/ meds to beds program?: No    Meds To Beds General Rules:  1. Can ONLY be done Monday- Friday between 8:30am-5pm  2. Prescription(s) must be in pharmacy by 3pm to be filled same day  3. Copy of patient's insurance/ prescription drug card and patient face sheet must be sent along with the prescription(s)  4. Cost of Rx cannot be added to hospital bill. If financial assistance is needed, please contact unit  or ;  or  CANNOT provide pharmacy voucher for patients co-pays  5.  Patients can then  the prescription on their way out of the hospital at discharge, or pharmacy can deliver to the bedside if staff is available. (payment due at time of pick-up or delivery - cash, check, or card accepted)     Able to afford home medications/ co-pay costs: Yes    ADLS:  Current PT AM-PAC Score:   /24  Current OT AM-PAC Score: /24      DISCHARGE Disposition: Home- No Services Needed    LOC at discharge: Not Applicable  ABBY Completed: Not Indicated    Notification completed in HENS/PAS?:  Not Applicable    IMM Completed:   Not Indicated    Transportation:  Transportation PLAN for discharge: family   Mode of Transport: [address] ordered at discharge: Not [address]: Not Applicable  Orders faxed: No    Durable Medical Equipment:  DME Provider: none  Equipment obtained during hospitalization:     Home Oxygen and Respiratory Equipment:  Oxygen needed at discharge?: Not [address]: Not Applicable  Portable tank available for discharge?: Not Indicated    Dialysis:  Dialysis patient: No    Dialysis Center:  Not Applicable      Additional CM Notes: Pt from home with family support will return home no needs at DC. The Plan for Transition of Care is related to the following treatment goals of S/P DELROY (total abdominal hysterectomy) [Z90.710]    The Patient and/or patient representative Carisa Valdez and her family were provided with a choice of provider and agrees with the discharge plan Yes    Freedom of choice list was provided with basic dialogue that supports the patient's individualized plan of care/goals and shares the quality data associated with the providers.  Not Indicated    Care Transitions patient: No    Yasmine Johns RN  [facility]  Case Management Department  Ph: [phone]  Fax: [phone]

## 2020-11-15 NOTE — PLAN OF CARE
Problem: Falls - Risk of:  Goal: Will remain free from falls  Description: Will remain free from falls  Note: Pt A&O, VSS. Bed in lowest position, call light and belongings within reach, room free from clutter. Continuing to hourly round to reduce fall risks. Problem: Pain:  Goal: Control of acute pain  Description: Control of acute pain  Note: Pt denies acute pain to abd incision. Only states slight discomfort when sitting up. Reinforcing pt education r/t appropriate pain management and expectations. Continuing to monitor for changes in status and for the need to adjust interventions.

## 2020-11-16 LAB
ESTIMATED AVERAGE GLUCOSE: 96.8 MG/DL
HBA1C MFR BLD: 5 %

## 2020-11-18 NOTE — DISCHARGE SUMMARY
[facility]               [address]                               DISCHARGE SUMMARY  PATIENT NAME: Denise Lock                       :        1970  MED REC NO:   [id number]                          ROOM:       5309  ACCOUNT NO:   [de-identified]                           ADMIT DATE: 2020  PROVIDER:     Hoa Sullivan MD                  DISCHARGE DATE:  11/15/2020    ADMITTING DIAGNOSES:  1. Menorrhagia. 2.  Fibroids,   3. History of left ovarian cyst.  4.  Anemia     DISCHARGE DIAGNOSES:  1. Menorrhagia. 2.  Fibroids, history of left ovarian cyst.  3.  Bilateral paratubal cyst.  4.  Status post total abdominal hysterectomy with bilateral  salpingectomies. 5.  Tachycardia. 6.  Anemia     HOSPITAL COURSE:  The patient is a 82-year-old female who had  longstanding history of menorrhagia, fibroids and a 4-cm left ovarian  cyst.  She underwent a total abdominal hysterectomy with bilateral  salpingectomies and removal of bilateral paratubal cyst  On postop day #2, the patient was noted to have tachycardia with heart rate ranging between  100 -116 beats per minute. Secondary to this,  she had serial H/H  which showed stabilization of hemoglobin at 9. Medicine consult was called and the patient was evaluated by Medicine  and they thought the cause of tachycardia was probably postoperative state,   dehydration and hyponatremia. Work-up done including CT chest, chest x ray and EKG for tachycardia were normal -   Rest of postoperative course was uncomplicated. The patient was discharged home on postop day #3 in stable condition. DISCHARGE MEDICATIONS:  Percocet, Colace, Motrin, milk of magnesia. DISCHARGE CONDITION:  Stable. FOLLOWUP:  Two weeks with me in the office.         Kendrick Barraza MD    D: 2020 10:48:43       T: 2020 12:20:23     [id number]  Job#: [id number]     Doc#: [id number]    CC:

## 2020-12-01 ENCOUNTER — OFFICE VISIT (OUTPATIENT)
Dept: GYNECOLOGY | Age: 50
End: 2020-12-01

## 2020-12-01 ENCOUNTER — TELEPHONE (OUTPATIENT)
Dept: GYNECOLOGY | Age: 50
End: 2020-12-01

## 2020-12-01 VITALS
SYSTOLIC BLOOD PRESSURE: 123 MMHG | DIASTOLIC BLOOD PRESSURE: 79 MMHG | TEMPERATURE: 97.9 F | HEART RATE: 99 BPM | BODY MASS INDEX: 29.89 KG/M2 | WEIGHT: 163.4 LBS | RESPIRATION RATE: 16 BRPM

## 2020-12-01 PROCEDURE — 99024 POSTOP FOLLOW-UP VISIT: CPT | Performed by: OBSTETRICS & GYNECOLOGY

## 2020-12-01 RX ORDER — BISACODYL 10 MG
SUPPOSITORY, RECTAL RECTAL
Qty: 10 SUPPOSITORY | Refills: 0 | Status: SHIPPED | OUTPATIENT
Start: 2020-12-01

## 2020-12-01 RX ORDER — DIPHENHYDRAMINE HCL 25 MG
CAPSULE ORAL
Qty: 30 CAPSULE | Refills: 0 | Status: SHIPPED | OUTPATIENT
Start: 2020-12-01 | End: 2021-01-05

## 2020-12-01 RX ORDER — LANOLIN ALCOHOL/MO/W.PET/CERES
325 CREAM (GRAM) TOPICAL 2 TIMES DAILY
Qty: 60 TABLET | Refills: 0 | Status: SHIPPED | OUTPATIENT
Start: 2020-12-01

## 2020-12-01 RX ORDER — IBUPROFEN 800 MG/1
800 TABLET ORAL EVERY 8 HOURS PRN
Qty: 60 TABLET | Refills: 0 | Status: SHIPPED | OUTPATIENT
Start: 2020-12-01 | End: 2020-12-21 | Stop reason: SDUPTHER

## 2020-12-01 ASSESSMENT — ENCOUNTER SYMPTOMS
APNEA: 0
ABDOMINAL PAIN: 0
BACK PAIN: 0
SORE THROAT: 0
DIARRHEA: 0
SHORTNESS OF BREATH: 0
ANAL BLEEDING: 0
CHEST TIGHTNESS: 0
VOMITING: 0
BLOOD IN STOOL: 0
CONSTIPATION: 1
COLOR CHANGE: 0
PHOTOPHOBIA: 0
TROUBLE SWALLOWING: 0
COUGH: 0
WHEEZING: 0
NAUSEA: 0
ABDOMINAL DISTENTION: 0
RECTAL PAIN: 0

## 2020-12-01 NOTE — LETTER
[address] [facility] Gyne[facility]  [address] [facility]  Phone: [phone]  Fax: [phone]    Ella Kirby MD        December 1, 2020     Patient: Bartolome Burton   YOB: 1970   Date of Visit: 12/1/2020       To Whom it May Concern:    Bartolome Burton was seen in my clinic on 12/1/2020. She had surgery on 11/12/2020 and may return to work on 12/21/2020. If you have any questions or concerns, please don't hesitate to call.     Sincerely,         Ella Kirby MD

## 2020-12-01 NOTE — LETTER
[facility]ne[facility]  [address]  Phone: [phone]  Fax: [phone]    Jc Cordon MD        December 1, 2020     Patient: Alfredo Campuzano   YOB: 1970   Date of Visit: 12/1/2020       To Whom it May Concern:    Alfredo Campuzano was seen in my clinic on 12/1/2020. She had surgery on 11/12/2020 and may return to work on 1/6/2020. If you have any questions or concerns, please don't hesitate to call.     Sincerely,         Jc Cordon MD

## 2020-12-01 NOTE — TELEPHONE ENCOUNTER
FERROUS SULFATE 325 MG  Qty: 60  Take 1 tablet twice daily    Please send to [address] [place] [address] Kate, [phone]

## 2020-12-01 NOTE — PROGRESS NOTES
Annual GYN Visit    Joaquim Colunga  Date ofBirth:  1970    Date of Service:  12/1/2020    Chief Complaint:   Joaquim Colunga is a 54 y.o.  female who presents for 2 week post-op check     HPI:  Patient is premenopausal- Patient's last menstrual period was 10/13/2020 (exact date). .  She had a total abdominal hysterectomy with bilateral salpingectomies on 11/18/2020 - surgical pathology ->   FINAL DIAGNOSIS:     Uterus, cervix, bilateral tubes and right paratubal cyst:   - Adenomyosis. - Uterine leiomyomas. - Endometrial and endocervical polyps. - Nabothian cyst.   - Bilateral fallopian tubes with paratubal cysts. - Negative for atypia/malignancy. Reports severe constipation and insomnia since surgery. Denies fevers, no  symptoms, pain controlled with motrin  Health Maintenance   Topic Date Due    HIV screen  12/16/1987    Lipid screen  12/16/2012    Flu vaccine (1) 09/01/2020    DTaP/Tdap/Td vaccine (2 - Td) 08/16/2023    Diabetes screen  11/14/2023    Hepatitis A vaccine  Aged Out    Hepatitis B vaccine  Aged Out    Hib vaccine  Aged Out    Meningococcal (ACWY) vaccine  Aged Out    Pneumococcal 0-64 years Vaccine  Aged Out       Past Medical History:   Diagnosis Date    Fibroid uterus     with heavy menstrual bleeding     Past Surgical History:   Procedure Laterality Date    DELROY AND BSO Left 11/12/2020    TOTAL ABDOMINAL HYSTERECTOMY BILATERAL SALPINGECTOMIES, performed by Anita Morley MD at [address]     OB History   No obstetric history on file.      Social History     Socioeconomic History    Marital status:      Spouse name: Not on file    Number of children: Not on file    Years of education: Not on file    Highest education level: Not on file   Occupational History    Not on file   Social Needs    Financial resource strain: Not on file    Food insecurity     Worry: Not on file     Inability: Not on file    Transportation needs     Medical: Not on file Non-medical: Not on file   Tobacco Use    Smoking status: Never Smoker    Smokeless tobacco: Never Used   Substance and Sexual Activity    Alcohol use: Never     Frequency: Never    Drug use: Never    Sexual activity: Not on file   Lifestyle    Physical activity     Days per week: Not on file     Minutes per session: Not on file    Stress: Not on file   Relationships    Social connections     Talks on phone: Not on file     Gets together: Not on file     Attends [employer] service: Not on file     Active member of club or organization: Not on file     Attends meetings of clubs or organizations: Not on file     Relationship status: Not on file    Intimate partner violence     Fear of current or ex partner: Not on file     Emotionally abused: Not on file     Physically abused: Not on file     Forced sexual activity: Not on file   Other Topics Concern    Not on file   Social History Narrative    Not on file     No Known Allergies  Outpatient Medications Marked as Taking for the 12/1/20 encounter (Office Visit) with Leny Bonner MD   Medication Sig Dispense Refill    ibuprofen (ADVIL;MOTRIN) 800 MG tablet Take 1 tablet by mouth every 8 hours as needed for Pain (with meals) 60 tablet 0    diphenhydrAMINE (BENADRYL ALLERGY) 25 MG capsule Take one pill po qhs prn insomnia 30 capsule 0    bisacodyl (BISAC-EVAC) 10 MG suppository Place one suppository per rectum qd as needed for constipation 10 suppository 0    docusate sodium (COLACE, DULCOLAX) 100 MG CAPS Take 100 mg by mouth 2 times daily as needed for Constipation 60 capsule 0    ibuprofen (ADVIL;MOTRIN) 800 MG tablet Take 1 tablet by mouth every 8 hours as needed for Pain 60 tablet 0    magnesium hydroxide (MILK OF MAGNESIA) 400 MG/5ML suspension Take 15 mLs by mouth daily as needed for Constipation 1 Bottle 0    [DISCONTINUED] FEROSUL 325 (65 Fe) MG tablet Take one pill po bid 30 tablet 3    [DISCONTINUED] ferrous sulfate (IRON 325) 325 (65 Fe) MG tablet Take 1 tablet by mouth 2 times daily (with meals) 30 tablet 3    [DISCONTINUED] ferrous sulfate (FE TABS 325) 325 (65 Fe) MG EC tablet Take 1 tablet by mouth 2 times daily 60 tablet 0    vitamin B-12 (CYANOCOBALAMIN) 1000 MCG tablet TAKE ONE TABLET BY MOUTH DAILY      D3-50 1.25 MG (85024 UT) CAPS TK 1 C PO 1 TIME A WK      nortriptyline (PAMELOR) 25 MG capsule TK 1 C PO QHS      Probiotic Product (PROBIOTIC DAILY) CAPS TK ONE C PO QD       No family history on file. Review of Systems:  Review of Systems   Constitutional: Negative for appetite change, chills, fatigue, fever and unexpected weight change. HENT: Negative for ear pain, hearing loss, mouth sores, sore throat and trouble swallowing. Eyes: Negative for photophobia and visual disturbance. Respiratory: Negative for apnea, cough, chest tightness, shortness of breath and wheezing. Cardiovascular: Negative for chest pain, palpitations and leg swelling. Gastrointestinal: Positive for constipation. Negative for abdominal distention, abdominal pain, anal bleeding, blood in stool, diarrhea, nausea, rectal pain and vomiting. Endocrine: Negative for cold intolerance, heat intolerance, polydipsia, polyphagia and polyuria. Genitourinary: Negative for difficulty urinating, dyspareunia, dysuria, enuresis, frequency, genital sores, hematuria, menstrual problem, pelvic pain, urgency, vaginal bleeding, vaginal discharge and vaginal pain. Musculoskeletal: Negative for arthralgias, back pain, joint swelling and myalgias. Skin: Negative for color change and rash. Neurological: Negative for dizziness, seizures, syncope, light-headedness and headaches. Psychiatric/Behavioral: Negative for agitation, behavioral problems, confusion, decreased concentration, dysphoric mood, hallucinations, self-injury, sleep disturbance and suicidal ideas. The patient is not nervous/anxious and is not hyperactive.         Physical Exam:  /79 Pulse 99   Temp 97.9 °F (36.6 °C) (Temporal)   Resp 16   Wt 163 lb 6.4 oz (74.1 kg)   LMP 10/13/2020 (Exact Date)   Breastfeeding No   BMI 29.89 kg/m²   BP Readings from Last 3 Encounters:   12/01/20 123/79   11/15/20 130/82   11/12/20 122/60     Body mass index is 29.89 kg/m². Physical Exam  Constitutional:       General: She is not in acute distress. Appearance: She is well-developed. HENT:      Head: Normocephalic and atraumatic. Mouth/Throat:      Pharynx: No oropharyngeal exudate. Eyes:      General: No scleral icterus. Right eye: No discharge. Left eye: No discharge. Conjunctiva/sclera: Conjunctivae normal.   Neck:      Thyroid: No thyromegaly. Cardiovascular:      Rate and Rhythm: Normal rate and regular rhythm. Heart sounds: Normal heart sounds. Pulmonary:      Effort: Pulmonary effort is normal.      Breath sounds: Normal breath sounds. Abdominal:      General: Bowel sounds are normal. There is no distension. Palpations: Abdomen is soft. There is no mass. Tenderness: There is no abdominal tenderness. There is no guarding or rebound. Comments: Incision - clean, dry, healing well    Skin:     General: Skin is warm. Coloration: Skin is not pale. Findings: No erythema or rash. Neurological:      Mental Status: She is alert. Psychiatric:         Behavior: Behavior normal. Behavior is cooperative. Thought Content: Thought content normal.         Judgment: Judgment normal.           Assessment/Plan:  1. Postop check  S/p   She had a total abdominal hysterectomy with bilateral salpingectomies on 11/18/2020 - doing well since surgery, no major issues     surgical pathology ->   FINAL DIAGNOSIS:     Uterus, cervix, bilateral tubes and right paratubal cyst:   - Adenomyosis. - Uterine leiomyomas. - Endometrial and endocervical polyps. - Nabothian cyst.   - Bilateral fallopian tubes with paratubal cysts.    - Negative for

## 2020-12-21 RX ORDER — IBUPROFEN 800 MG/1
800 TABLET ORAL EVERY 8 HOURS PRN
Qty: 60 TABLET | Refills: 0 | Status: SHIPPED | OUTPATIENT
Start: 2020-12-21 | End: 2021-01-05

## 2020-12-21 NOTE — TELEPHONE ENCOUNTER
Last office visit Visit date not found     Last written 12/1/2020    Next office visit scheduled Visit date not found    Requested Prescriptions     Pending Prescriptions Disp Refills    ibuprofen (ADVIL;MOTRIN) 800 MG tablet 60 tablet 0     Sig: Take 1 tablet by mouth every 8 hours as needed for Pain (with meals)

## 2021-01-05 ENCOUNTER — OFFICE VISIT (OUTPATIENT)
Dept: GYNECOLOGY | Age: 51
End: 2021-01-05

## 2021-01-05 VITALS
SYSTOLIC BLOOD PRESSURE: 123 MMHG | DIASTOLIC BLOOD PRESSURE: 83 MMHG | HEIGHT: 62 IN | WEIGHT: 167 LBS | BODY MASS INDEX: 30.73 KG/M2 | TEMPERATURE: 97.7 F | RESPIRATION RATE: 16 BRPM | HEART RATE: 89 BPM

## 2021-01-05 DIAGNOSIS — Z09 POSTOP CHECK: ICD-10-CM

## 2021-01-05 DIAGNOSIS — G47.00 INSOMNIA, UNSPECIFIED TYPE: ICD-10-CM

## 2021-01-05 DIAGNOSIS — R53.83 FATIGUE, UNSPECIFIED TYPE: ICD-10-CM

## 2021-01-05 DIAGNOSIS — K59.00 CONSTIPATION, UNSPECIFIED CONSTIPATION TYPE: ICD-10-CM

## 2021-01-05 DIAGNOSIS — Z09 POSTOP CHECK: Primary | ICD-10-CM

## 2021-01-05 LAB
HCT VFR BLD CALC: 34.4 % (ref 36–48)
HEMOGLOBIN: 10.8 G/DL (ref 12–16)
MCH RBC QN AUTO: 23.6 PG (ref 26–34)
MCHC RBC AUTO-ENTMCNC: 31.3 G/DL (ref 31–36)
MCV RBC AUTO: 75.5 FL (ref 80–100)
PDW BLD-RTO: 19.3 % (ref 12.4–15.4)
PLATELET # BLD: 260 K/UL (ref 135–450)
PMV BLD AUTO: 10.1 FL (ref 5–10.5)
RBC # BLD: 4.55 M/UL (ref 4–5.2)
WBC # BLD: 9.5 K/UL (ref 4–11)

## 2021-01-05 PROCEDURE — 1036F TOBACCO NON-USER: CPT | Performed by: OBSTETRICS & GYNECOLOGY

## 2021-01-05 PROCEDURE — 99024 POSTOP FOLLOW-UP VISIT: CPT | Performed by: OBSTETRICS & GYNECOLOGY

## 2021-01-05 PROCEDURE — G8427 DOCREV CUR MEDS BY ELIG CLIN: HCPCS | Performed by: OBSTETRICS & GYNECOLOGY

## 2021-01-05 PROCEDURE — G8484 FLU IMMUNIZE NO ADMIN: HCPCS | Performed by: OBSTETRICS & GYNECOLOGY

## 2021-01-05 PROCEDURE — G8419 CALC BMI OUT NRM PARAM NOF/U: HCPCS | Performed by: OBSTETRICS & GYNECOLOGY

## 2021-01-05 RX ORDER — IBUPROFEN 600 MG/1
600 TABLET ORAL EVERY 8 HOURS PRN
Qty: 30 TABLET | Refills: 0 | Status: SHIPPED | OUTPATIENT
Start: 2021-01-05 | End: 2021-01-13

## 2021-01-05 RX ORDER — DOCUSATE SODIUM 100 MG/1
100 CAPSULE, LIQUID FILLED ORAL DAILY PRN
Qty: 30 CAPSULE | Refills: 0 | Status: SHIPPED | OUTPATIENT
Start: 2021-01-05

## 2021-01-05 RX ORDER — DIPHENHYDRAMINE HCL 25 MG
25 CAPSULE ORAL NIGHTLY PRN
Qty: 30 CAPSULE | Refills: 0 | Status: SHIPPED | OUTPATIENT
Start: 2021-01-05 | End: 2021-01-15

## 2021-01-05 ASSESSMENT — ENCOUNTER SYMPTOMS
WHEEZING: 0
RECTAL PAIN: 0
SHORTNESS OF BREATH: 0
COUGH: 0
VOMITING: 0
CONSTIPATION: 1
TROUBLE SWALLOWING: 0
BLOOD IN STOOL: 0
NAUSEA: 0
COLOR CHANGE: 0
ABDOMINAL PAIN: 0
ANAL BLEEDING: 0
BACK PAIN: 0
APNEA: 0
DIARRHEA: 0
PHOTOPHOBIA: 0
ABDOMINAL DISTENTION: 0
CHEST TIGHTNESS: 0
SORE THROAT: 0

## 2021-01-05 NOTE — PROGRESS NOTES
Diego Cintron  YOB: 1970    Date of Service:  2021    Chief Complaint:   Diego Cintron is a 52 y.o.  female who presents for 6 week post-op check        HPI:  Patient is premenopausal- Patient's last menstrual period was 10/13/2020 (exact date). .  She had a total abdominal hysterectomy with bilateral salpingectomies on 2020 - surgical pathology ->   FINAL DIAGNOSIS:     Uterus, cervix, bilateral tubes and right paratubal cyst:   - Adenomyosis. - Uterine leiomyomas. - Endometrial and endocervical polyps. - Nabothian cyst.   - Bilateral fallopian tubes with paratubal cysts. - Negative for atypia/malignancy.      Reports fatigue, constipation and insomnia improved with colace and benadryl. She denies abdomina pain, no other issues since surgery     Health Maintenance   Topic Date Due    Hepatitis C screen  1970    HIV screen  1987    Lipid screen  2012    Flu vaccine (1) 2020    DTaP/Tdap/Td vaccine (2 - Td) 2023    Diabetes screen  2023    Hepatitis A vaccine  Aged Out    Hepatitis B vaccine  Aged Out    Hib vaccine  Aged Out    Meningococcal (ACWY) vaccine  Aged Out    Pneumococcal 0-64 years Vaccine  Aged Out       Past Medical History:   Diagnosis Date    Fibroid uterus     with heavy menstrual bleeding     Past Surgical History:   Procedure Laterality Date    DELROY AND BSO Left 2020    TOTAL ABDOMINAL HYSTERECTOMY BILATERAL SALPINGECTOMIES, performed by Jarrod Santiago MD at [address]     OB History   No obstetric history on file.      Social History     Socioeconomic History    Marital status:      Spouse name: Not on file    Number of children: Not on file    Years of education: Not on file    Highest education level: Not on file   Occupational History    Not on file   Social Needs    Financial resource strain: Not on file    Food insecurity     Worry: Not on file     Inability: Not on file  Transportation needs     Medical: Not on file     Non-medical: Not on file   Tobacco Use    Smoking status: Never Smoker    Smokeless tobacco: Never Used   Substance and Sexual Activity    Alcohol use: Never     Frequency: Never    Drug use: Never    Sexual activity: Not on file   Lifestyle    Physical activity     Days per week: Not on file     Minutes per session: Not on file    Stress: Not on file   Relationships    Social connections     Talks on phone: Not on file     Gets together: Not on file     Attends [employer] service: Not on file     Active member of club or organization: Not on file     Attends meetings of clubs or organizations: Not on file     Relationship status: Not on file    Intimate partner violence     Fear of current or ex partner: Not on file     Emotionally abused: Not on file     Physically abused: Not on file     Forced sexual activity: Not on file   Other Topics Concern    Not on file   Social History Narrative    Not on file     No Known Allergies  Outpatient Medications Marked as Taking for the 1/5/21 encounter (Office Visit) with Gauri Stoner MD   Medication Sig Dispense Refill    diphenhydrAMINE (BENADRYL) 25 MG capsule Take 1 capsule by mouth nightly as needed for Sleep 30 capsule 0    ibuprofen (ADVIL;MOTRIN) 600 MG tablet Take 1 tablet by mouth every 8 hours as needed for Pain 30 tablet 0    docusate sodium (COLACE) 100 MG capsule Take 1 capsule by mouth daily as needed for Constipation 30 capsule 0    ferrous sulfate (FE TABS 325) 325 (65 Fe) MG EC tablet Take 1 tablet by mouth 2 times daily 60 tablet 0    magnesium hydroxide (MILK OF MAGNESIA) 400 MG/5ML suspension Take 15 mLs by mouth daily as needed for Constipation 1 Bottle 0    vitamin B-12 (CYANOCOBALAMIN) 1000 MCG tablet TAKE ONE TABLET BY MOUTH DAILY      D3-50 1.25 MG (45594 UT) CAPS TK 1 C PO 1 TIME A WK      PROCTOZONE-HC 2.5 % CREA rectal cream PETE EXT AA BID  nortriptyline (PAMELOR) 25 MG capsule TK 1 C PO QHS      Probiotic Product (PROBIOTIC DAILY) CAPS TK ONE C PO QD       No family history on file. Review ofSystems:  Review of Systems   Constitutional: Negative for appetite change, chills, fatigue, fever and unexpected weight change. HENT: Negative for ear pain, hearing loss, mouth sores, sore throat and trouble swallowing. Eyes: Negative for photophobia and visual disturbance. Respiratory: Negative for apnea, cough, chest tightness, shortness of breath and wheezing. Cardiovascular: Negative for chest pain, palpitations and leg swelling. Gastrointestinal: Positive for constipation. Negative for abdominal distention, abdominal pain, anal bleeding, blood in stool, diarrhea, nausea, rectal pain and vomiting. Endocrine: Negative for cold intolerance, heat intolerance, polydipsia, polyphagia and polyuria. Genitourinary: Negative for difficulty urinating, dyspareunia, dysuria, enuresis, frequency, genital sores, hematuria, menstrual problem, pelvic pain, urgency, vaginal bleeding, vaginal discharge and vaginal pain. Musculoskeletal: Negative for arthralgias, back pain, joint swelling and myalgias. Skin: Negative for color change and rash. Neurological: Negative for dizziness, seizures, syncope, light-headedness and headaches. Psychiatric/Behavioral: Positive for sleep disturbance. Negative for agitation, behavioral problems, confusion, decreased concentration, dysphoric mood, hallucinations, self-injury and suicidal ideas. The patient is not nervous/anxious and is not hyperactive.         Physical Exam:  /83 (Site: Right Upper Arm, Position: Sitting, Cuff Size: Medium Adult)   Pulse 89   Temp 97.7 °F (36.5 °C) (Temporal)   Resp 16   Ht 5' 2\" (1.575 m)   Wt 167 lb (75.8 kg)   LMP 10/13/2020 (Exact Date)   BMI 30.54 kg/m²   BP Readings from Last 3 Encounters:   01/05/21 123/83   12/01/20 123/79   11/15/20 130/82 Body mass index is 30.54 kg/m². Physical Exam  Constitutional:       General: She is not in acute distress. Appearance: She is well-developed. HENT:      Head: Normocephalic and atraumatic. Mouth/Throat:      Pharynx: No oropharyngeal exudate. Eyes:      General: No scleral icterus. Right eye: No discharge. Left eye: No discharge. Conjunctiva/sclera: Conjunctivae normal.   Neck:      Thyroid: No thyromegaly. Cardiovascular:      Rate and Rhythm: Normal rate and regular rhythm. Heart sounds: Normal heart sounds. Pulmonary:      Effort: Pulmonary effort is normal.      Breath sounds: Normal breath sounds. Abdominal:      General: Bowel sounds are normal. There is no distension. Palpations: Abdomen is soft. There is no mass. Tenderness: There is no abdominal tenderness. There is no guarding or rebound. Comments: Surgical incision - well healed    Genitourinary:     Labia:         Right: No rash, tenderness, lesion or injury. Left: No rash, tenderness, lesion or injury. Vagina: Normal. No signs of injury and foreign body. No vaginal discharge, erythema or tenderness. Uterus: Absent. Adnexa:         Right: No mass, tenderness or fullness. Left: No mass, tenderness or fullness. Rectum: No external hemorrhoid. Comments: Vaginal cuff - well healed   Skin:     General: Skin is warm. Coloration: Skin is not pale. Findings: No erythema or rash. Neurological:      Mental Status: She is alert. Psychiatric:         Behavior: Behavior normal. Behavior is cooperative. Thought Content: Thought content normal.         Judgment: Judgment normal.             Assessment/Plan  1. Postop check  S/p DELROY+ BILATERAL SALPINGECTOMIES - doing well - no major issues      2. Fatigue, unspecified type  - CBC; Future  She history of anemia  Continue oral iron therapy    3.  Insomnia, unspecified type improved with benadryl   - diphenhydrAMINE (BENADRYL) 25 MG capsule; Take 1 capsule by mouth nightly as needed for Sleep  Dispense: 30 capsule; Refill: 0    4. Constipation, unspecified constipation type  Improved with colace      Return in about 3 months (around 4/5/2021).

## 2021-01-13 DIAGNOSIS — Z09 POSTOP CHECK: ICD-10-CM

## 2021-01-13 RX ORDER — IBUPROFEN 600 MG/1
600 TABLET ORAL EVERY 8 HOURS PRN
Qty: 30 TABLET | Refills: 0 | Status: SHIPPED | OUTPATIENT
Start: 2021-01-13

## 2021-02-01 DIAGNOSIS — Z09 POSTOP CHECK: ICD-10-CM

## 2021-02-01 RX ORDER — IBUPROFEN 800 MG/1
TABLET ORAL
Qty: 60 TABLET | Refills: 0 | OUTPATIENT
Start: 2021-02-01

## 2021-02-11 ENCOUNTER — TELEPHONE (OUTPATIENT)
Dept: GYNECOLOGY | Age: 51
End: 2021-02-11

## 2021-02-11 NOTE — TELEPHONE ENCOUNTER
Louisa Child needs a hysterectomy steralization consent form from [facility]'s surgery on 11/12/2020. She needs this faxed to [phone]. This is so her insurance will cover her surgery. Her voice mail is confidential and so is the fax number given for Louisa Child with [employer].

## 2021-02-17 ENCOUNTER — TELEPHONE (OUTPATIENT)
Dept: GYNECOLOGY | Age: 51
End: 2021-02-17

## 2021-02-17 NOTE — TELEPHONE ENCOUNTER
Pt needed to cancel appt for today. She was asking for a Monday appt  But she comes to \[facility]""  Location not sure if she was understanding bc of language  is at a different location on mon. She went with appt for 3/10  But its a post op appt so might want to check to be sure if it needs to be sooner .  If so please call pt back

## 2021-02-25 ENCOUNTER — TELEPHONE (OUTPATIENT)
Dept: GYNECOLOGY | Age: 51
End: 2021-02-25

## 2021-02-25 NOTE — TELEPHONE ENCOUNTER
[facility] received a call from:    Name of Caller: Matilda    Relationship to patient:    Organization name: [employer] number: [phone]    Reason for call: [facility] needs [facility] consent form in order for patient's insurance to cover the procedure done on 11/12/2020.  Can call back to get that to form to them or upload onto patient's chart  Please call back at [phone]

## 2021-03-08 ENCOUNTER — TELEPHONE (OUTPATIENT)
Dept: PRIMARY CARE CLINIC | Age: 51
End: 2021-03-08

## 2021-03-08 NOTE — TELEPHONE ENCOUNTER
Reason for call: [facility] needs [address] [place] [address] Dr Medicaid consent form in order for patient's insurance to cover the procedure done on 11/12/2020. Can call back to get that to form to them or upload onto patient's chart  Please call back at [phone]      I have a copy of form,  I will scanned into pt chart under imaging. Can someone please make sure Dr Suraj Moralse gets them thanks.

## 2021-03-10 ENCOUNTER — OFFICE VISIT (OUTPATIENT)
Dept: GYNECOLOGY | Age: 51
End: 2021-03-10
Payer: MEDICAID

## 2021-03-10 VITALS
OXYGEN SATURATION: 98 % | HEIGHT: 62 IN | WEIGHT: 171 LBS | SYSTOLIC BLOOD PRESSURE: 134 MMHG | TEMPERATURE: 98 F | DIASTOLIC BLOOD PRESSURE: 74 MMHG | HEART RATE: 94 BPM | BODY MASS INDEX: 31.47 KG/M2 | RESPIRATION RATE: 16 BRPM

## 2021-03-10 DIAGNOSIS — Z90.710 S/P TAH (TOTAL ABDOMINAL HYSTERECTOMY): Primary | ICD-10-CM

## 2021-03-10 DIAGNOSIS — K59.00 CONSTIPATION, UNSPECIFIED CONSTIPATION TYPE: ICD-10-CM

## 2021-03-10 PROCEDURE — 99213 OFFICE O/P EST LOW 20 MIN: CPT | Performed by: OBSTETRICS & GYNECOLOGY

## 2021-03-10 RX ORDER — DOCUSATE SODIUM 100 MG/1
100 CAPSULE, LIQUID FILLED ORAL 2 TIMES DAILY PRN
Qty: 30 CAPSULE | Refills: 0 | Status: SHIPPED | OUTPATIENT
Start: 2021-03-10

## 2021-03-10 RX ORDER — IBUPROFEN 600 MG/1
600 TABLET ORAL EVERY 8 HOURS PRN
Qty: 60 TABLET | Refills: 0 | Status: SHIPPED | OUTPATIENT
Start: 2021-03-10 | End: 2021-03-31

## 2021-03-10 ASSESSMENT — ENCOUNTER SYMPTOMS
NAUSEA: 0
TROUBLE SWALLOWING: 0
RECTAL PAIN: 0
APNEA: 0
CONSTIPATION: 0
WHEEZING: 0
DIARRHEA: 0
SORE THROAT: 0
ABDOMINAL DISTENTION: 0
ABDOMINAL PAIN: 0
COUGH: 0
BACK PAIN: 0
BLOOD IN STOOL: 0
PHOTOPHOBIA: 0
VOMITING: 0
SHORTNESS OF BREATH: 0
CHEST TIGHTNESS: 0
COLOR CHANGE: 0
ANAL BLEEDING: 0

## 2021-03-10 NOTE — PROGRESS NOTES
Daljit Irving  YOB: 1970    Date of Service:  3/10/2021    Chief Complaint:   Daljit Irving is a 52 y.o.  female who presents for 3 month post-op follow-up       HPI:  Patient is premenopausal- Patient's last menstrual period was 10/13/2020 (exact date). .She is here for 3 month post-op follow-up. Reports occasional constipation and lower abdominal pin (mild), no associated GI/ symptoms. No fevers, no vaginal bleeding. She had a total abdominal hysterectomy with bilateral salpingectomies on 2020 - surgical pathology ->   FINAL DIAGNOSIS:     Uterus, cervix, bilateral tubes and right paratubal cyst:   - Adenomyosis. - Uterine leiomyomas. - Endometrial and endocervical polyps. - Nabothian cyst.   - Bilateral fallopian tubes with paratubal cysts. - Negative for atypia/malignancy. Health Maintenance   Topic Date Due    Hepatitis C screen  Never done    HIV screen  Never done    Lipid screen  Never done    Flu vaccine (1) 2020    DTaP/Tdap/Td vaccine (2 - Td) 2023    Diabetes screen  2023    Hepatitis A vaccine  Aged Out    Hepatitis B vaccine  Aged Out    Hib vaccine  Aged Out    Meningococcal (ACWY) vaccine  Aged Out    Pneumococcal 0-64 years Vaccine  Aged Out       Past Medical History:   Diagnosis Date    Fibroid uterus     with heavy menstrual bleeding     Past Surgical History:   Procedure Laterality Date    DELROY AND BSO Left 2020    TOTAL ABDOMINAL HYSTERECTOMY BILATERAL SALPINGECTOMIES, performed by Billy Banuelos MD at [address]     OB History   No obstetric history on file.      Social History     Socioeconomic History    Marital status:      Spouse name: Not on file    Number of children: Not on file    Years of education: Not on file    Highest education level: Not on file   Occupational History    Not on file   Social Needs    Financial resource strain: Not on file    Food insecurity     Worry: Not on file Inability: Not on file    Transportation needs     Medical: Not on file     Non-medical: Not on file   Tobacco Use    Smoking status: Never Smoker    Smokeless tobacco: Never Used   Substance and Sexual Activity    Alcohol use: Never     Frequency: Never    Drug use: Never    Sexual activity: Not on file   Lifestyle    Physical activity     Days per week: Not on file     Minutes per session: Not on file    Stress: Not on file   Relationships    Social connections     Talks on phone: Not on file     Gets together: Not on file     Attends Restorationism service: Not on file     Active member of club or organization: Not on file     Attends meetings of clubs or organizations: Not on file     Relationship status: Not on file    Intimate partner violence     Fear of current or ex partner: Not on file     Emotionally abused: Not on file     Physically abused: Not on file     Forced sexual activity: Not on file   Other Topics Concern    Not on file   Social History Narrative    Not on file     No Known Allergies  Outpatient Medications Marked as Taking for the 3/10/21 encounter (Office Visit) with Martha Billings MD   Medication Sig Dispense Refill    ibuprofen (ADVIL;MOTRIN) 600 MG tablet Take 1 tablet by mouth every 8 hours as needed for Pain 60 tablet 0    docusate sodium (COLACE) 100 MG capsule Take 1 capsule by mouth 2 times daily as needed for Constipation 30 capsule 0    ibuprofen (ADVIL;MOTRIN) 800 MG tablet TAKE 1 TABLET BY MOUTH EVERY 8 HOURS WITH MEALS AS NEEDED FOR PAIN 30 tablet 0    ibuprofen (ADVIL;MOTRIN) 600 MG tablet TAKE 1 TABLET BY MOUTH EVERY 8 HOURS AS NEEDED FOR PAIN 30 tablet 0    docusate sodium (COLACE) 100 MG capsule Take 1 capsule by mouth daily as needed for Constipation 30 capsule 0    bisacodyl (BISAC-EVAC) 10 MG suppository Place one suppository per rectum qd as needed for constipation 10 suppository 0    ferrous sulfate (FE TABS 325) 325 (65 Fe) MG EC tablet Take 1 tablet by mouth 2 times daily 60 tablet 0    magnesium hydroxide (MILK OF MAGNESIA) 400 MG/5ML suspension Take 15 mLs by mouth daily as needed for Constipation 1 Bottle 0    vitamin B-12 (CYANOCOBALAMIN) 1000 MCG tablet TAKE ONE TABLET BY MOUTH DAILY      D3-50 1.25 MG (35725 UT) CAPS TK 1 C PO 1 TIME A WK      PROCTOZONE-HC 2.5 % CREA rectal cream PETE EXT AA BID      nortriptyline (PAMELOR) 25 MG capsule TK 1 C PO QHS      Probiotic Product (PROBIOTIC DAILY) CAPS TK ONE C PO QD       No family history on file. Review ofSystems:  Review of Systems   Constitutional: Negative for appetite change, chills, fatigue, fever and unexpected weight change. HENT: Negative for ear pain, hearing loss, mouth sores, sore throat and trouble swallowing. Eyes: Negative for photophobia and visual disturbance. Respiratory: Negative for apnea, cough, chest tightness, shortness of breath and wheezing. Cardiovascular: Negative for chest pain, palpitations and leg swelling. Gastrointestinal: Negative for abdominal distention, abdominal pain, anal bleeding, blood in stool, constipation, diarrhea, nausea, rectal pain and vomiting. Endocrine: Negative for cold intolerance, heat intolerance, polydipsia, polyphagia and polyuria. Genitourinary: Negative for difficulty urinating, dyspareunia, dysuria, enuresis, frequency, genital sores, hematuria, menstrual problem, pelvic pain, urgency, vaginal bleeding, vaginal discharge and vaginal pain. Musculoskeletal: Negative for arthralgias, back pain, joint swelling and myalgias. Skin: Negative for color change and rash. Neurological: Negative for dizziness, seizures, syncope, light-headedness and headaches. Psychiatric/Behavioral: Negative for agitation, behavioral problems, confusion, decreased concentration, dysphoric mood, hallucinations, self-injury, sleep disturbance and suicidal ideas. The patient is not nervous/anxious and is not hyperactive.         Physical Exam:  /74 (Site: Right Upper Arm, Position: Sitting, Cuff Size: Medium Adult)   Pulse 94   Temp 98 °F (36.7 °C) (Temporal)   Resp 16   Ht 5' 2\" (1.575 m)   Wt 171 lb (77.6 kg)   LMP 10/13/2020 (Exact Date)   SpO2 98%   BMI 31.28 kg/m²   BP Readings from Last 3 Encounters:   03/10/21 134/74   01/05/21 123/83   12/01/20 123/79      Body mass index is 31.28 kg/m². Physical Exam  Constitutional:       General: She is not in acute distress. Appearance: She is well-developed. HENT:      Head: Normocephalic and atraumatic. Mouth/Throat:      Pharynx: No oropharyngeal exudate. Eyes:      General: No scleral icterus. Right eye: No discharge. Left eye: No discharge. Conjunctiva/sclera: Conjunctivae normal.   Neck:      Musculoskeletal: Normal range of motion and neck supple. Thyroid: No thyromegaly. Cardiovascular:      Rate and Rhythm: Normal rate and regular rhythm. Heart sounds: Normal heart sounds. Pulmonary:      Effort: Pulmonary effort is normal. No respiratory distress. Breath sounds: Normal breath sounds. Abdominal:      General: Bowel sounds are normal. There is no distension. Palpations: Abdomen is soft. There is no mass. Tenderness: There is no abdominal tenderness. There is no guarding or rebound. Genitourinary:     Labia:         Right: No rash, tenderness, lesion or injury. Left: No rash, tenderness, lesion or injury. Vagina: Normal. No signs of injury and foreign body. No vaginal discharge, erythema or tenderness. Cervix: Dilated. Adnexa:         Right: No mass, tenderness or fullness. Left: No mass, tenderness or fullness. Rectum: No external hemorrhoid. Skin:     General: Skin is warm. Coloration: Skin is not pale. Findings: No erythema or rash. Neurological:      Mental Status: She is alert and oriented to person, place, and time.    Psychiatric:         Behavior: Behavior normal. Behavior is cooperative. Thought Content: Thought content normal.         Judgment: Judgment normal.             Assessment/Plan  1. S/P DELROY (total abdominal hysterectomy)  3 months post-op - appropriate recovery   - ibuprofen (ADVIL;MOTRIN) 600 MG tablet; Take 1 tablet by mouth every 8 hours as needed for Pain  Dispense: 60 tablet; Refill: 0    2. Constipation, unspecified constipation type  - docusate sodium (COLACE) 100 MG capsule; Take 1 capsule by mouth 2 times daily as needed for Constipation  Dispense: 30 capsule; Refill: 0      Return in about 3 months (around 6/10/2021).

## 2021-03-19 ENCOUNTER — TELEPHONE (OUTPATIENT)
Dept: GYNECOLOGY | Age: 51
End: 2021-03-19

## 2021-03-19 NOTE — TELEPHONE ENCOUNTER
[facility] received a call from:    Name of Caller: Gavi Lanza    Relationship to patient:[facility]    Organization name: [facility] [employer] contact number: [phone]    Reason for call: [facility] [employer] dept needs a copy of the [facility] Hysterectomy Consent Form from patient's surgery last November. Please fax that form to: [phone].  Please advise

## 2021-03-30 DIAGNOSIS — Z90.710 S/P TAH (TOTAL ABDOMINAL HYSTERECTOMY): ICD-10-CM

## 2021-03-31 RX ORDER — IBUPROFEN 600 MG/1
600 TABLET ORAL EVERY 8 HOURS PRN
Qty: 60 TABLET | Refills: 0 | Status: SHIPPED | OUTPATIENT
Start: 2021-03-31

## 2021-06-02 ENCOUNTER — CLINICAL DOCUMENTATION (OUTPATIENT)
Dept: OTHER | Age: 51
End: 2021-06-02

## (undated) DEVICE — BLANKET WRM W29.9XL79.1IN UP BODY FORC AIR MISTRAL-AIR

## (undated) DEVICE — TRAY CATHETER 16FR F INCLUDE BARDX IC COMPLT CARE DRNGE BG

## (undated) DEVICE — SUTURE VCRL SZ 0 L36IN ABSRB UD L36MM CT-1 1/2 CIR J946H

## (undated) DEVICE — DEVICE SEAL L23CM NANO COAT MARYLAND JAW OPN DIV LIGASURE

## (undated) DEVICE — PAD MATERNITY CURITY ADH STRIP DISP

## (undated) DEVICE — SUTURE VCRL SZ 3-0 L27IN ABSRB UD L36MM CT-1 1/2 CIR J258H

## (undated) DEVICE — PLATE ES AD W 9FT CRD 2

## (undated) DEVICE — SURGICAL SET UP - SURE SET: Brand: MEDLINE INDUSTRIES, INC.

## (undated) DEVICE — 40583 XL ADVANCED TRENDELENBURG POSITIONING KIT: Brand: 40583 XL ADVANCED TRENDELENBURG POSITIONING KIT

## (undated) DEVICE — APPLICATOR MEDICATED 26 CC SOLUTION HI LT ORNG CHLORAPREP

## (undated) DEVICE — SUTURE ABSORBABLE BRAIDED 0 CT-1 8X27 IN UD VICRYL JJ41G

## (undated) DEVICE — SUTURE VCRL SZ 0 L27IN ABSRB UD L36MM CT-1 1/2 CIR J260H

## (undated) DEVICE — SUTURE VCRL UD BR CT 3-0 18IN CT1 J838D

## (undated) DEVICE — PACK LAP IV REINF TBL CVR ADH UTIL UNDERBUTTOCK DRP W CUF

## (undated) DEVICE — DRAPE,LAP,CHOLE,W/TROUGHS,STERILE: Brand: MEDLINE

## (undated) DEVICE — SPONGE,LAP,18"X18",DLX,XR,ST,5/PK,40/PK: Brand: MEDLINE

## (undated) DEVICE — ELECTROSURGICAL PENCIL ROCKER SWITCH NON COATED BLADE ELECTRODE 10 FT (3 M) CORD HOLSTER: Brand: MEGADYNE

## (undated) DEVICE — STAPLER SKIN H3.9MM WIRE DIA0.58MM CRWN 6.9MM 35 STPL ROT

## (undated) DEVICE — JEWISH HOSPITAL TURNOVER KIT: Brand: MEDLINE INDUSTRIES, INC.

## (undated) DEVICE — SURE SET-DOUBLE BASIN-LF: Brand: MEDLINE INDUSTRIES, INC.

## (undated) DEVICE — SUTURE VCRL SZ 2-0 L12X18IN ABSRB UD POLYGLACTIN 910 BRAID J911T

## (undated) DEVICE — YANKAUER,OPEN TIP,W/O VENT,STERILE: Brand: MEDLINE INDUSTRIES, INC.

## (undated) DEVICE — AGENT HEMSTAT 3GM OXIDIZED REGENERATED CELOS ABSRB FOR CONT (ORDER MULTIPLES OF 5EA)

## (undated) DEVICE — COVER LT HNDL BLU PLAS

## (undated) DEVICE — ADHESIVE SKIN CLSR 0.7ML TOP DERMBND ADV

## (undated) DEVICE — TRAY PREP DRY W/ PREM GLV 2 APPL 6 SPNG 2 UNDPD 1 OVERWRAP

## (undated) DEVICE — GARMENT,MEDLINE,DVT,INT,CALF,MED, GEN2: Brand: MEDLINE

## (undated) DEVICE — GLOVE ORANGE PI 7   MSG9070

## (undated) DEVICE — INTENDED FOR TISSUE SEPARATION, AND OTHER PROCEDURES THAT REQUIRE A SHARP SURGICAL BLADE TO PUNCTURE OR CUT.: Brand: BARD-PARKER ® CARBON RIB-BACK BLADES

## (undated) DEVICE — BINDER ABD UNIV H9IN WAIST 30-45IN E SFT COT PREM 3 PNL

## (undated) DEVICE — SOLUTION IV 1000ML 0.9% SOD CHL

## (undated) DEVICE — ELECTRODE BLDE L6.5IN CAUT EXT DISP

## (undated) DEVICE — SUTURE MCRYL SZ 4-0 L27IN ABSRB UD L19MM PS-2 1/2 CIR PRIM Y426H

## (undated) DEVICE — SUTURE VCRL SZ 3-0 L27IN ABSRB UD L26MM SH 1/2 CIR J416H